# Patient Record
Sex: MALE | Race: BLACK OR AFRICAN AMERICAN | NOT HISPANIC OR LATINO | Employment: OTHER | URBAN - METROPOLITAN AREA
[De-identification: names, ages, dates, MRNs, and addresses within clinical notes are randomized per-mention and may not be internally consistent; named-entity substitution may affect disease eponyms.]

---

## 2021-04-10 ENCOUNTER — APPOINTMENT (EMERGENCY)
Dept: RADIOLOGY | Facility: HOSPITAL | Age: 36
End: 2021-04-10
Payer: COMMERCIAL

## 2021-04-10 ENCOUNTER — HOSPITAL ENCOUNTER (EMERGENCY)
Facility: HOSPITAL | Age: 36
Discharge: HOME | End: 2021-04-10
Attending: EMERGENCY MEDICINE
Payer: COMMERCIAL

## 2021-04-10 VITALS
TEMPERATURE: 97.7 F | DIASTOLIC BLOOD PRESSURE: 88 MMHG | WEIGHT: 162 LBS | SYSTOLIC BLOOD PRESSURE: 167 MMHG | HEART RATE: 88 BPM | RESPIRATION RATE: 20 BRPM | OXYGEN SATURATION: 100 %

## 2021-04-10 DIAGNOSIS — S43.102A SEPARATION OF LEFT ACROMIOCLAVICULAR JOINT, INITIAL ENCOUNTER: ICD-10-CM

## 2021-04-10 DIAGNOSIS — M25.512 ACUTE PAIN OF LEFT SHOULDER: Primary | ICD-10-CM

## 2021-04-10 PROCEDURE — 99283 EMERGENCY DEPT VISIT LOW MDM: CPT

## 2021-04-10 PROCEDURE — 73030 X-RAY EXAM OF SHOULDER: CPT

## 2021-04-10 PROCEDURE — 96361 HYDRATE IV INFUSION ADD-ON: CPT

## 2021-04-10 PROCEDURE — 96375 TX/PRO/DX INJ NEW DRUG ADDON: CPT

## 2021-04-10 PROCEDURE — 99285 EMERGENCY DEPT VISIT HI MDM: CPT | Performed by: EMERGENCY MEDICINE

## 2021-04-10 PROCEDURE — 96374 THER/PROPH/DIAG INJ IV PUSH: CPT

## 2021-04-10 RX ORDER — ACETAMINOPHEN 500 MG
500 TABLET ORAL EVERY 6 HOURS PRN
Qty: 20 TABLET | Refills: 0 | Status: SHIPPED | OUTPATIENT
Start: 2021-04-10

## 2021-04-10 RX ORDER — MORPHINE SULFATE 4 MG/ML
4 INJECTION, SOLUTION INTRAMUSCULAR; INTRAVENOUS ONCE
Status: COMPLETED | OUTPATIENT
Start: 2021-04-10 | End: 2021-04-10

## 2021-04-10 RX ORDER — NAPROXEN 500 MG/1
500 TABLET ORAL 2 TIMES DAILY WITH MEALS
Qty: 30 TABLET | Refills: 0 | Status: SHIPPED | OUTPATIENT
Start: 2021-04-10 | End: 2021-04-15 | Stop reason: SDUPTHER

## 2021-04-10 RX ORDER — OXYCODONE HYDROCHLORIDE 5 MG/1
5 TABLET ORAL EVERY 4 HOURS PRN
Qty: 5 TABLET | Refills: 0 | Status: SHIPPED | OUTPATIENT
Start: 2021-04-10 | End: 2021-04-12

## 2021-04-10 RX ORDER — ONDANSETRON 4 MG/1
4 TABLET, ORALLY DISINTEGRATING ORAL EVERY 6 HOURS PRN
Qty: 20 TABLET | Refills: 0 | Status: SHIPPED | OUTPATIENT
Start: 2021-04-10 | End: 2022-06-21

## 2021-04-10 RX ORDER — ONDANSETRON 2 MG/ML
4 INJECTION INTRAMUSCULAR; INTRAVENOUS ONCE
Status: COMPLETED | OUTPATIENT
Start: 2021-04-10 | End: 2021-04-10

## 2021-04-10 RX ADMIN — MORPHINE SULFATE 4 MG: 4 INJECTION INTRAVENOUS at 17:01

## 2021-04-10 RX ADMIN — SODIUM CHLORIDE 1000 ML: 0.9 INJECTION, SOLUTION INTRAVENOUS at 16:58

## 2021-04-10 RX ADMIN — ONDANSETRON 4 MG: 2 INJECTION INTRAMUSCULAR; INTRAVENOUS at 16:59
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
## 2021-04-15 VITALS
SYSTOLIC BLOOD PRESSURE: 124 MMHG | WEIGHT: 160 LBS | HEIGHT: 69 IN | BODY MASS INDEX: 23.7 KG/M2 | HEART RATE: 61 BPM | DIASTOLIC BLOOD PRESSURE: 81 MMHG

## 2021-04-15 DIAGNOSIS — S43.102A SEPARATION OF LEFT ACROMIOCLAVICULAR JOINT, INITIAL ENCOUNTER: ICD-10-CM

## 2021-04-15 PROCEDURE — 99203 OFFICE O/P NEW LOW 30 MIN: CPT | Performed by: PHYSICIAN ASSISTANT

## 2021-04-15 RX ORDER — NAPROXEN 500 MG/1
500 TABLET ORAL 2 TIMES DAILY WITH MEALS
Qty: 30 TABLET | Refills: 0 | Status: SHIPPED | OUTPATIENT
Start: 2021-04-15 | End: 2022-06-21

## 2021-04-15 RX ORDER — HYDROCODONE BITARTRATE AND ACETAMINOPHEN 5; 325 MG/1; MG/1
TABLET ORAL
COMMUNITY
End: 2022-06-21

## 2021-04-15 RX ORDER — CYCLOBENZAPRINE HCL 10 MG
10 TABLET ORAL 3 TIMES DAILY PRN
Qty: 20 TABLET | Refills: 0 | Status: SHIPPED | OUTPATIENT
Start: 2021-04-15
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
## 2021-04-29 ENCOUNTER — HOSPITAL ENCOUNTER (OUTPATIENT)
Dept: RADIOLOGY | Facility: IMAGING CENTER | Age: 36
Discharge: HOME | End: 2021-04-29
Payer: COMMERCIAL

## 2021-04-29 DIAGNOSIS — S43.102A SEPARATION OF LEFT ACROMIOCLAVICULAR JOINT, INITIAL ENCOUNTER: ICD-10-CM

## 2021-04-29 PROCEDURE — G1004 CDSM NDSC: HCPCS

## 2021-04-29 PROCEDURE — 73221 MRI JOINT UPR EXTREM W/O DYE: CPT

## 2021-05-06 VITALS
SYSTOLIC BLOOD PRESSURE: 150 MMHG | HEIGHT: 69 IN | DIASTOLIC BLOOD PRESSURE: 99 MMHG | HEART RATE: 61 BPM | WEIGHT: 160.2 LBS | BODY MASS INDEX: 23.73 KG/M2

## 2021-05-06 DIAGNOSIS — M25.512 ACUTE PAIN OF LEFT SHOULDER: ICD-10-CM

## 2021-05-06 DIAGNOSIS — S43.109A ACROMIOCLAVICULAR JOINT SEPARATION, TYPE 3, INITIAL ENCOUNTER: Primary | ICD-10-CM

## 2021-05-06 PROCEDURE — 99214 OFFICE O/P EST MOD 30 MIN: CPT | Performed by: ORTHOPAEDIC SURGERY
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
## 2021-05-10 ENCOUNTER — EVALUATION (OUTPATIENT)
Dept: PHYSICAL THERAPY | Facility: REHABILITATION | Age: 36
End: 2021-05-10
Payer: COMMERCIAL

## 2021-05-10 DIAGNOSIS — M25.512 ACUTE PAIN OF LEFT SHOULDER: ICD-10-CM

## 2021-05-10 DIAGNOSIS — S43.109A ACROMIOCLAVICULAR JOINT SEPARATION, TYPE 3, INITIAL ENCOUNTER: ICD-10-CM

## 2021-05-10 PROCEDURE — 97110 THERAPEUTIC EXERCISES: CPT | Performed by: PHYSICAL THERAPIST

## 2021-05-10 PROCEDURE — 97112 NEUROMUSCULAR REEDUCATION: CPT | Performed by: PHYSICAL THERAPIST

## 2021-05-10 PROCEDURE — 97161 PT EVAL LOW COMPLEX 20 MIN: CPT | Performed by: PHYSICAL THERAPIST
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
## 2021-05-13 ENCOUNTER — OFFICE VISIT (OUTPATIENT)
Dept: PHYSICAL THERAPY | Facility: REHABILITATION | Age: 36
End: 2021-05-13
Payer: COMMERCIAL

## 2021-05-13 DIAGNOSIS — S43.109A ACROMIOCLAVICULAR JOINT SEPARATION, TYPE 3, INITIAL ENCOUNTER: Primary | ICD-10-CM

## 2021-05-13 DIAGNOSIS — M25.512 ACUTE PAIN OF LEFT SHOULDER: ICD-10-CM

## 2021-05-13 PROCEDURE — 97110 THERAPEUTIC EXERCISES: CPT | Performed by: PHYSICAL THERAPIST

## 2021-05-13 PROCEDURE — 97140 MANUAL THERAPY 1/> REGIONS: CPT | Performed by: PHYSICAL THERAPIST

## 2021-05-13 PROCEDURE — 97112 NEUROMUSCULAR REEDUCATION: CPT | Performed by: PHYSICAL THERAPIST

## 2021-05-13 NOTE — PROGRESS NOTES
Daily Note     Today's date: 2021  Patient name: Katharina Nixon  : 1985  MRN: [id number]  Referring provider: Lazarus Hake, MD  Dx:   Encounter Diagnosis     ICD-10-CM    1  Acromioclavicular joint separation, type 3, initial encounter  S43 109A    2  Acute pain of left shoulder  M25 512                   Subjective: Reports still feeling some burning and pain upon arrival today       Objective: See treatment diary below      Assessment: Tolerated treatment well  Patient would benefit from continued PT, was able to demonstrate HEP with no cueing necessary throughout, patient did well with first tx  Plan: Continue per plan of care        Precautions: none       Manuals              L shoulder flex,abd PROM CW 10'                                                    Neuro Re-Ed             TB rows, extensions  2x15 ea GTB            B/L shoulder ER TB 20x5" GTB            Prone shoulder extension w hold                                                                 Ther Ex             Pulleys  6'             UBE 3/3'             Supine cane flexion  10x10"             Wall slides                                                                  Ther Activity                                       Gait Training                                       Modalities

## 2021-05-18 ENCOUNTER — OFFICE VISIT (OUTPATIENT)
Dept: PHYSICAL THERAPY | Facility: REHABILITATION | Age: 36
End: 2021-05-18
Payer: COMMERCIAL

## 2021-05-18 DIAGNOSIS — S43.109A ACROMIOCLAVICULAR JOINT SEPARATION, TYPE 3, INITIAL ENCOUNTER: Primary | ICD-10-CM

## 2021-05-18 DIAGNOSIS — M25.512 ACUTE PAIN OF LEFT SHOULDER: ICD-10-CM

## 2021-05-18 PROCEDURE — 97112 NEUROMUSCULAR REEDUCATION: CPT | Performed by: PHYSICAL THERAPIST

## 2021-05-18 PROCEDURE — 97110 THERAPEUTIC EXERCISES: CPT | Performed by: PHYSICAL THERAPIST

## 2021-05-18 PROCEDURE — 97140 MANUAL THERAPY 1/> REGIONS: CPT | Performed by: PHYSICAL THERAPIST

## 2021-05-18 NOTE — PROGRESS NOTES
Daily Note     Today's date: 2021  Patient name: Yennifer Harrison  : 1985  MRN: [id number]  Referring provider: Fitz Baxter MD  Dx:   Encounter Diagnosis     ICD-10-CM    1  Acromioclavicular joint separation, type 3, initial encounter  S43 109A    2  Acute pain of left shoulder  M25 512                   Subjective: Reports no new complaints upon arrival today  Objective: See treatment diary below      Assessment: Tolerated treatment well  Patient would benefit from continued PT, reports no complaints throughout session today, and was able to increase L shoulder range of motion into flexion and abduction today  Plan: Continue per plan of care        Precautions: none       Manuals             L shoulder flex,abd PROM CW 10'  CW 10'                                                   Neuro Re-Ed             TB rows, extensions  2x15 ea GTB 2x15 ea R16/R20            B/L shoulder ER TB 20x5" GTB 2x15x5" GTB           Prone shoulder extension w hold                                                                 Ther Ex             Pulleys  6'  np           UBE 3/3'  4/4'            Supine cane flexion  10x10"  20x10"            Wall slides   10x10"                                                                Ther Activity                                       Gait Training                                       Modalities

## 2021-05-20 ENCOUNTER — OFFICE VISIT (OUTPATIENT)
Dept: PHYSICAL THERAPY | Facility: REHABILITATION | Age: 36
End: 2021-05-20
Payer: COMMERCIAL

## 2021-05-20 DIAGNOSIS — M25.512 ACUTE PAIN OF LEFT SHOULDER: ICD-10-CM

## 2021-05-20 DIAGNOSIS — S43.109A ACROMIOCLAVICULAR JOINT SEPARATION, TYPE 3, INITIAL ENCOUNTER: Primary | ICD-10-CM

## 2021-05-20 PROCEDURE — 97110 THERAPEUTIC EXERCISES: CPT | Performed by: PHYSICAL THERAPIST

## 2021-05-20 PROCEDURE — 97112 NEUROMUSCULAR REEDUCATION: CPT | Performed by: PHYSICAL THERAPIST

## 2021-05-20 PROCEDURE — 97140 MANUAL THERAPY 1/> REGIONS: CPT | Performed by: PHYSICAL THERAPIST

## 2021-05-20 NOTE — PROGRESS NOTES
Daily Note     Today's date: 2021  Patient name: Lilli Winslow  : 1985  MRN: [id number]  Referring provider: Annie Montesinos MD  Dx:   Encounter Diagnosis     ICD-10-CM    1  Acromioclavicular joint separation, type 3, initial encounter  S43 109A    2  Acute pain of left shoulder  M25 512                   Subjective: "I feel like I'm doing more and more with my shoulder and feeling better"       Objective: See treatment diary below      Assessment: Tolerated treatment well  Patient would benefit from continued PT, no complaints throughout session today, demonstrated improved L shoulder range of motion  Plan: Continue per plan of care        Precautions: none       Manuals            L shoulder flex,abd PROM CW 10'  CW 10'  CW 10'                                                  Neuro Re-Ed             TB rows, extensions  2x15 ea GTB 2x15 ea R16/R20  2x15 ea MTB          B/L shoulder ER TB 20x5" GTB 2x15x5" GTB           Prone shoulder extension w hold   30x5"           Prone T's   30x5"                                                  Ther Ex             Pulleys  6'  np np          UBE 3/3'  4/4'  4/4'           Supine cane flexion  10x10"  20x10"            Wall slides   10x10"  [id number]" OTB          Wall walks TB   2x10 OTB                                                 Ther Activity                                       Gait Training                                       Modalities

## 2021-05-25 ENCOUNTER — APPOINTMENT (OUTPATIENT)
Dept: PHYSICAL THERAPY | Facility: REHABILITATION | Age: 36
End: 2021-05-25
Payer: COMMERCIAL

## 2021-05-27 ENCOUNTER — OFFICE VISIT (OUTPATIENT)
Dept: PHYSICAL THERAPY | Facility: REHABILITATION | Age: 36
End: 2021-05-27
Payer: COMMERCIAL

## 2021-05-27 DIAGNOSIS — S43.109A ACROMIOCLAVICULAR JOINT SEPARATION, TYPE 3, INITIAL ENCOUNTER: Primary | ICD-10-CM

## 2021-05-27 DIAGNOSIS — M25.512 ACUTE PAIN OF LEFT SHOULDER: ICD-10-CM

## 2021-05-27 PROCEDURE — 97112 NEUROMUSCULAR REEDUCATION: CPT | Performed by: PHYSICAL THERAPIST

## 2021-05-27 PROCEDURE — 97110 THERAPEUTIC EXERCISES: CPT | Performed by: PHYSICAL THERAPIST

## 2021-05-27 PROCEDURE — 97140 MANUAL THERAPY 1/> REGIONS: CPT | Performed by: PHYSICAL THERAPIST

## 2021-05-27 NOTE — PROGRESS NOTES
Daily Note     Today's date: 2021  Patient name: Reggie Amanda  : 1985  MRN: [id number]  Referring provider: Judy Jaramillo MD  Dx:   Encounter Diagnosis     ICD-10-CM    1  Acromioclavicular joint separation, type 3, initial encounter  S43 109A    2  Acute pain of left shoulder  M25 512                   Subjective: Reports no new complaints upon arrival to therapy today  Objective: See treatment diary below      Assessment: Tolerated treatment well  Patient would benefit from continued PT, demonstrates improved range of motion today with decreased complaints of pain  Can continue to progress in future tx's  Plan: Continue per plan of care        Precautions: none       Manuals           L shoulder flex,abd PROM CW 10'  CW 10'  CW 10'  CW 10'                                                 Neuro Re-Ed             rows, extensions  2x15 ea GTB 2x15 ea R16/R20  2x15 ea MTB 2x20 ea 20# rows 24# LPD          B/L shoulder ER TB 20x5" GTB 2x15x5" GTB  2x10x5"          Prone shoulder extension w hold   30x5"  30x5"          Prone T's   30x5"  30x5"                                                 Ther Ex             Pulleys  6'  np np np         UBE 3/3'  4/4'  4/4'  4/4'          Supine cane flexion  10x10"  20x10"            Wall slides   10x10"  [id number]" OTB [id number]" GTB         Wall walks TB   2x10 OTB 2x10 GTB         Wall clocks TB                                       Ther Activity                                       Gait Training                                       Modalities

## 2021-06-02 ENCOUNTER — APPOINTMENT (OUTPATIENT)
Dept: PHYSICAL THERAPY | Facility: REHABILITATION | Age: 36
End: 2021-06-02
Payer: COMMERCIAL

## 2021-06-03 ENCOUNTER — OFFICE VISIT (OUTPATIENT)
Dept: PHYSICAL THERAPY | Facility: REHABILITATION | Age: 36
End: 2021-06-03
Payer: COMMERCIAL

## 2021-06-03 DIAGNOSIS — S43.109A ACROMIOCLAVICULAR JOINT SEPARATION, TYPE 3, INITIAL ENCOUNTER: Primary | ICD-10-CM

## 2021-06-03 DIAGNOSIS — M25.512 ACUTE PAIN OF LEFT SHOULDER: ICD-10-CM

## 2021-06-03 PROCEDURE — 97112 NEUROMUSCULAR REEDUCATION: CPT | Performed by: PHYSICAL THERAPIST

## 2021-06-03 PROCEDURE — 97110 THERAPEUTIC EXERCISES: CPT | Performed by: PHYSICAL THERAPIST

## 2021-06-03 PROCEDURE — 97140 MANUAL THERAPY 1/> REGIONS: CPT | Performed by: PHYSICAL THERAPIST

## 2021-06-03 NOTE — PROGRESS NOTES
Daily Note     Today's date: 6/3/2021  Patient name: Benjamin Chaparro  : 1985  MRN: [id number]  Referring provider: Marry Kingston MD  Dx:   Encounter Diagnosis     ICD-10-CM    1  Acromioclavicular joint separation, type 3, initial encounter  S43 109A    2  Acute pain of left shoulder  M25 512                   Subjective: Reports feeling better upon arrival today       Objective: See treatment diary below      Assessment: Tolerated treatment well  Patient would benefit from continued PT, patient educated on HEP to perform to continue through rehab from shoulder injury  Patient will contact if further PT is needed         Plan: D/C to HEP     Precautions: none       Manuals 5/13  5/18  5/20  5/27  6/3         L shoulder flex,abd PROM CW 10'  CW 10'  CW 10'  CW 10'  CW 10'                                                Neuro Re-Ed             rows, extensions  2x15 ea GTB 2x15 ea R16/R20  2x15 ea MTB 2x20 ea 20# rows 24# LPD          B/L shoulder ER TB 20x5" GTB 2x15x5" GTB  2x10x5"          Prone shoulder extension w hold   30x5"  30x5"  30x5"         Prone T's   30x5"  30x5"  30x5"                                                Ther Ex             Pulleys  6'  np np np         UBE 3/3'  4/4'  4/4'  4/4'  3/3'         Supine cane flexion  10x10"  20x10"            Wall slides   10x10"  [id number]" OTB 7u25j19" GTB np        Wall walks TB   2x10 OTB 2x10 GTB 2x10 GTB        Wall clocks TB     5x GTB                                  Ther Activity                                       Gait Training                                       Modalities

## 2022-06-21 ENCOUNTER — OFFICE VISIT (OUTPATIENT)
Dept: FAMILY MEDICINE CLINIC | Facility: CLINIC | Age: 37
End: 2022-06-21
Payer: COMMERCIAL

## 2022-06-21 VITALS
DIASTOLIC BLOOD PRESSURE: 74 MMHG | SYSTOLIC BLOOD PRESSURE: 128 MMHG | WEIGHT: 151.6 LBS | TEMPERATURE: 97.8 F | RESPIRATION RATE: 18 BRPM | HEART RATE: 68 BPM | BODY MASS INDEX: 22.45 KG/M2 | OXYGEN SATURATION: 98 % | HEIGHT: 69 IN

## 2022-06-21 DIAGNOSIS — R06.81 WITNESSED EPISODE OF APNEA: Primary | ICD-10-CM

## 2022-06-21 DIAGNOSIS — T14.8XXA SPLINTER: ICD-10-CM

## 2022-06-21 DIAGNOSIS — Z11.59 NEED FOR HEPATITIS C SCREENING TEST: ICD-10-CM

## 2022-06-21 DIAGNOSIS — Z23 ENCOUNTER FOR IMMUNIZATION: ICD-10-CM

## 2022-06-21 DIAGNOSIS — K59.1 FUNCTIONAL DIARRHEA: ICD-10-CM

## 2022-06-21 DIAGNOSIS — Z11.4 SCREENING FOR HIV (HUMAN IMMUNODEFICIENCY VIRUS): ICD-10-CM

## 2022-06-21 PROCEDURE — 99204 OFFICE O/P NEW MOD 45 MIN: CPT | Performed by: FAMILY MEDICINE

## 2022-06-21 RX ORDER — TIZANIDINE 4 MG/1
4 TABLET ORAL 2 TIMES DAILY
COMMUNITY
Start: 2022-05-19

## 2022-06-21 RX ORDER — MELOXICAM 7.5 MG/1
7.5 TABLET ORAL 2 TIMES DAILY WITH MEALS
COMMUNITY
Start: 2022-05-19

## 2022-06-21 RX ORDER — OXYCODONE HYDROCHLORIDE 10 MG/1
10 TABLET ORAL EVERY 6 HOURS PRN
COMMUNITY
Start: 2022-06-03

## 2022-06-21 NOTE — PROGRESS NOTES
[address] [facility]    NAME: Monty Kehr  AGE: 39 y o  SEX: male  : 1985     DATE: 2022     Assessment and Plan:     Problem List Items Addressed This Visit    None     Visit Diagnoses     Witnessed episode of apnea    -  Primary    Relevant Orders    Ambulatory Referral to Sleep Medicine    Need for hepatitis C screening test        Relevant Orders    Hepatitis C Antibody ([facility])    Screening for HIV (human immunodeficiency virus)        Relevant Orders    HIV 1/2 Antigen/Antibody (4th Generation) w Reflex [facility]    Encounter for immunization        Functional diarrhea        Relevant Orders    Comprehensive metabolic panel    CBC and differential    Splinter        Relevant Medications    Diclofenac Sodium (VOLTAREN) 1 %    Other Relevant Orders    Ambulatory Referral to Hand Surgery          Immunizations and preventive care screenings were discussed with patient today  Appropriate education was printed on patient's after visit summary  Chief Complaint:     Chief Complaint   Patient presents with   [address] Foreign Body in Skin     Right palm of hand  Pt reported hegot a long splinter in hand one month ago  Pt believes there is still something in there  Darkened spot near base of thumb  Pain is 2/10 at rest, 8/10 when pressure applied      History of Present Illness:     Adult Annual Physical   Patient here for a comprehensive physical exam  The patient reports recent splinter in hand that he says is  1 month later  Also reports anxiety but states he feels it is well handled as he always has someone with him  Diet and Physical Activity  · Diet/Nutrition: low fat diet, limited fruits/vegetables and fair but not perfect diet  Junk food after dinner      · Exercise: 5-7 times a week on average, less than 30 minutes on average and works a physical job        Depression Screening  PHQ-2/9 Depression Screening    Little interest or pleasure in doing things: 0 - not at all  Feeling down, depressed, or hopeless: 0 - not at all  PHQ-2 Score: 0  PHQ-2 Interpretation: Negative depression screen       General Health  · Sleep: sleeps well, gets 7-8 hours of sleep on average, witnessed apnea and sweats  · Hearing: normal - bilateral   · Vision: most recent eye exam >1 year ago and wears glasses  · Dental: regular dental visits, does not floss and q48 brushing   Health  · History of STDs?: no      Review of Systems:     Review of Systems   Constitutional: Positive for diaphoresis  Negative for chills, fever and unexpected weight change  HENT: Negative for congestion, ear pain, hearing loss, rhinorrhea, sore throat and tinnitus  Eyes: Negative for pain and visual disturbance  Respiratory: Negative for cough and shortness of breath  Cardiovascular: Negative for chest pain and palpitations  Gastrointestinal: Positive for diarrhea, nausea and vomiting  Negative for abdominal pain and constipation  Endocrine: Negative for polyuria  Genitourinary: Negative for dysuria and hematuria  Musculoskeletal: Negative for arthralgias and back pain  Skin: Positive for wound  Negative for color change and rash  Neurological: Negative for dizziness, seizures, syncope, light-headedness and headaches  All other systems reviewed and are negative  Past Medical History:     Past Medical History:   Diagnosis Date    Asthma     PTSD (post-traumatic stress disorder)       Past Surgical History:     History reviewed  No pertinent surgical history     Social History:     Social History     Socioeconomic History    Marital status: Single     Spouse name: None    Number of children: None    Years of education: None    Highest education level: None   Occupational History    None   Tobacco Use    Smoking status: Current Every Day Smoker    Smokeless tobacco: Never Used    Tobacco comment: Pt uses vape pen   Vaping Use    Vaping Use: Every day    Substances: Nicotine   Substance and Sexual Activity    Alcohol use: Not Currently    Drug use: Yes     Types: Marijuana     Comment: Pt has medical marijuana card    Sexual activity: None   Other Topics Concern    None   Social History Narrative    None     Social Determinants of Health     Financial Resource Strain: Not on file   Food Insecurity: Not on file   Transportation Needs: Not on file   Physical Activity: Not on file   Stress: Not on file   Social Connections: Not on file   Intimate Partner Violence: Not on file   Housing Stability: Not on file      Family History:     Family History   Problem Relation Age of Onset    Stroke Father     Glaucoma Father       Current Medications:     Current Outpatient Medications   Medication Sig Dispense Refill    acetaminophen (TYLENOL) 500 mg tablet Take 1 tablet (500 mg total) by mouth every 6 (six) hours as needed for mild pain 20 tablet 0    cyclobenzaprine (FLEXERIL) 10 mg tablet Take 1 tablet (10 mg total) by mouth 3 (three) times a day as needed for muscle spasms 20 tablet 0    Diclofenac Sodium (VOLTAREN) 1 % APPLY SPARINGLY TO THE AFFECTED AREA TWICE A DAY AS NEEDED      meloxicam (MOBIC) 7 5 mg tablet Take 7 5 mg by mouth 2 (two) times a day with meals      oxyCODONE (ROXICODONE) 10 MG TABS Take 10 mg by mouth every 6 (six) hours as needed      tiZANidine (ZANAFLEX) 4 mg tablet 4 mg 2 (two) times a day       No current facility-administered medications for this visit  Allergies:     No Known Allergies   Physical Exam:     /74   Pulse 68   Temp 97 8 °F (36 6 °C) (Tympanic)   Resp 18   Ht 5' 9" (1 753 m)   Wt 68 8 kg (151 lb 9 6 oz)   SpO2 98%   BMI 22 39 kg/m²     Physical Exam  Vitals and nursing note reviewed  Constitutional:       Appearance: He is well-developed  HENT:      Head: Normocephalic and atraumatic     Eyes:      Conjunctiva/sclera: Conjunctivae normal  Cardiovascular:      Rate and Rhythm: Normal rate and regular rhythm  Heart sounds: No murmur heard  Pulmonary:      Effort: Pulmonary effort is normal  No respiratory distress  Breath sounds: Normal breath sounds  Abdominal:      Palpations: Abdomen is soft  Tenderness: There is no abdominal tenderness  Musculoskeletal:      Cervical back: Neck supple  Skin:     General: Skin is warm and dry  Findings: Lesion (R hand has dark area on thenar eminence, fibrosis noted on POCUS but no foreign object identified on US) present  Neurological:      Mental Status: He is alert and oriented to person, place, and time     Psychiatric:         Behavior: Behavior normal           Anselmo Ovalles DO   [address]

## 2022-06-24 ENCOUNTER — TELEPHONE (OUTPATIENT)
Dept: OBGYN CLINIC | Facility: HOSPITAL | Age: 37
End: 2022-06-24

## 2022-06-24 NOTE — TELEPHONE ENCOUNTER
Hello,  Please advise if the following patient can be forced onto the schedule:  Patient:  Ashanti See   :  [birth year]  MRN:  [id number]  INSURANCE:  [employer]  (didn't have card available to update system)  Call back #:[phone]  Reason for appointment:   Patient has a splinter in rt palm & it is black  Requested doctor/location: [place] office  Thank you in advance!

## 2022-07-25 ENCOUNTER — TELEPHONE (OUTPATIENT)
Dept: OTHER | Facility: OTHER | Age: 37
End: 2022-07-25

## 2022-07-25 ENCOUNTER — OFFICE VISIT (OUTPATIENT)
Dept: FAMILY MEDICINE CLINIC | Facility: CLINIC | Age: 37
End: 2022-07-25
Payer: COMMERCIAL

## 2022-07-25 VITALS
SYSTOLIC BLOOD PRESSURE: 132 MMHG | BODY MASS INDEX: 21.71 KG/M2 | OXYGEN SATURATION: 98 % | WEIGHT: 147 LBS | DIASTOLIC BLOOD PRESSURE: 74 MMHG | HEART RATE: 75 BPM | RESPIRATION RATE: 18 BRPM | TEMPERATURE: 98.4 F

## 2022-07-25 DIAGNOSIS — K40.90 INDIRECT RIGHT INGUINAL HERNIA: Primary | ICD-10-CM

## 2022-07-25 PROCEDURE — 99213 OFFICE O/P EST LOW 20 MIN: CPT | Performed by: FAMILY MEDICINE

## 2022-07-25 NOTE — PATIENT INSTRUCTIONS
Inguinal Hernia   WHAT YOU NEED TO KNOW:   What is an inguinal hernia? An inguinal hernia happens when organs or abdominal tissue push through a weak spot in the abdominal wall  The abdominal wall is made of fat and muscle  It holds the intestines in place  The hernia may contain fluid, tissue from the abdomen, or part of an organ (such as an intestine)  What causes an inguinal hernia? The cause of your hernia may not be known  You may have been born with a weak spot or opening in the abdominal wall  The area may have become weak from surgery or an injury  You may get a hernia after you lift something heavy or strain during a bowel movement  Your risk for a hernia may be increased if you smoke or you are overweight  Inguinal hernias are more common in males  A family history of hernias increases your risk for an inguinal hernia  What are the signs and symptoms of an inguinal hernia? A hernia may happen over time or it may happen suddenly  Some movements can make symptoms worse  Examples include when you cough, sneeze, strain to have a bowel movement, lift, or stand for a long time  You may have any of the following:  A soft lump or bulge in your groin, lower abdomen, or scrotum     Pain or burning in your abdomen    How is an inguinal hernia diagnosed? Your healthcare provider may ask you to bend or cough to see if he can feel your hernia  You may need blood or urine tests to check your kidney function or find signs of infection  X-ray, MRI, CT scan, or ultrasound pictures may show blockage in the intestines or lack of blood flow to organs  You may be given contrast liquid to help the organs show up better in the pictures  Tell the healthcare provider if you have ever had an allergic reaction to contrast liquid  Do not enter the MRI room with anything metal  Metal can cause serious injury  Tell the healthcare provider if you have any metal in or on your body  How is an inguinal hernia treated?    A manual reduction of the hernia  may be needed  Manual reduction means your healthcare provider will use his hands to put firm, steady pressure on your hernia  He will continue until the hernia disappears inside the abdominal wall  Surgery  may be needed if the hernia stops blood flow to any of the organs  Surgery may also be needed if the hernia causes a hole in the intestines, or blocks the intestines  How can I manage my symptoms and prevent another hernia? Do not lift anything heavy  Heavy lifting can make your hernia worse or cause another hernia  Ask your healthcare provider how much is safe for you to lift  Drink liquids as directed  Liquids may prevent constipation and straining during a bowel movement  Ask how much liquid to drink each day and which liquids are best for you  Eat foods high in fiber  Fiber may prevent constipation and straining during a bowel movement  Foods that contain fiber include fruits, vegetables, beans, lentils, and whole grains  Maintain a healthy weight  If you are overweight, weight loss may prevent your hernia from getting worse  It may also prevent another hernia  Talk to your healthcare provider about exercise and how to lose weight safely if you are overweight  Do not smoke  Nicotine and other chemicals in cigarettes and cigars can weaken the abdominal wall  This may increase your risk for another hernia  Ask your healthcare provider for information if you currently smoke and need help to quit  E-cigarettes or smokeless tobacco still contain nicotine  Talk to your healthcare provider before you use these products  Take NSAIDs as directed  NSAIDs, such as ibuprofen, help decrease swelling, pain, and fever  NSAIDs can cause stomach bleeding or kidney problems in certain people  If you take blood thinner medicine, always ask your healthcare provider if NSAIDs are safe for you  Always read the medicine label and follow directions      When should I seek immediate care? You have severe abdominal pain with nausea and vomiting  Your abdomen is larger than usual      Your hernia gets bigger or is purple or blue  You see blood in your bowel movements  You feel weak, dizzy, or faint  When should I contact my healthcare provider? You have a fever  You have questions or concerns about your condition or care  CARE AGREEMENT:   You have the right to help plan your care  Learn about your health condition and how it may be treated  Discuss treatment options with your healthcare providers to decide what care you want to receive  You always have the right to refuse treatment  The above information is an  only  It is not intended as medical advice for individual conditions or treatments  Talk to your doctor, nurse or pharmacist before following any medical regimen to see if it is safe and effective for you  © Copyright [employer] 2022 Information is for End User's use only and may not be sold, redistributed or otherwise used for commercial purposes   All illustrations and images included in CareNotes® are the copyrighted property of [employer]  or [address] [place], [place] [address]

## 2022-07-25 NOTE — PROGRESS NOTES
Assessment/Plan:      Diagnoses and all orders for this visit:    Indirect right inguinal hernia  -     Patient reports further development of his abdominal pain now more localized and reported to be worse with BMs  He notes a lump in the R inguinal region worse with straining, yelling, etc   -      Exam shows a bulge consistent with hernia in the R inguinal region  Not discolored  Not showing signs of incarceration    - Patient referred to General Surgery and given warning signs of incarceration including severe pain, inability to reduce the hernia, n/v, and/or f/c  Subjective:     Patient ID: Mirian Cisneros is a 39 y o  male  HPI    Patient reports further development of his abdominal pain now more localized and reported to be worse with BMs  He notes a lump in the R inguinal region worse with straining, yelling, etc  He does work a physical job, and exercises fairly regularly  Patient reports his anxiety/ptsd is well controled at present and that he does not want to address these at this time  Review of Systems   Constitutional: Negative for chills and fever  HENT: Negative for congestion, ear pain, rhinorrhea and sore throat  Eyes: Negative for pain and visual disturbance  Respiratory: Negative for cough and shortness of breath  Cardiovascular: Negative for chest pain and palpitations  Gastrointestinal: Positive for abdominal pain (worse when lifting or having BM)  Negative for constipation, diarrhea, nausea and vomiting  Genitourinary: Negative for dysuria and hematuria  Musculoskeletal: Negative for arthralgias and back pain  Skin: Negative for color change and rash  Neurological: Negative for seizures and syncope  All other systems reviewed and are negative  Objective:     Physical Exam  Constitutional:       General: He is not in acute distress  Appearance: He is well-developed and normal weight  He is not ill-appearing or toxic-appearing     HENT: Head: Normocephalic and atraumatic  Eyes:      Extraocular Movements: Extraocular movements intact  Pupils: Pupils are equal, round, and reactive to light  Cardiovascular:      Rate and Rhythm: Normal rate and regular rhythm  Heart sounds: Normal heart sounds  Pulmonary:      Effort: Pulmonary effort is normal    Abdominal:      Hernia: A hernia is present  Hernia is present in the right inguinal area  Skin:     General: Skin is warm and dry  Neurological:      Mental Status: He is alert and oriented to person, place, and time     Psychiatric:         Mood and Affect: Mood normal          Behavior: Behavior normal

## 2022-07-26 ENCOUNTER — APPOINTMENT (OUTPATIENT)
Dept: LAB | Facility: CLINIC | Age: 37
End: 2022-07-26
Payer: COMMERCIAL

## 2022-07-26 DIAGNOSIS — Z11.4 SCREENING FOR HIV (HUMAN IMMUNODEFICIENCY VIRUS): ICD-10-CM

## 2022-07-26 DIAGNOSIS — Z11.59 NEED FOR HEPATITIS C SCREENING TEST: ICD-10-CM

## 2022-07-26 DIAGNOSIS — K59.1 FUNCTIONAL DIARRHEA: ICD-10-CM

## 2022-07-26 LAB
ALBUMIN SERPL BCP-MCNC: 4.5 G/DL (ref 3.5–5)
ALP SERPL-CCNC: 54 U/L (ref 34–104)
ALT SERPL W P-5'-P-CCNC: 16 U/L (ref 7–52)
ANION GAP SERPL CALCULATED.3IONS-SCNC: 4 MMOL/L (ref 4–13)
AST SERPL W P-5'-P-CCNC: 13 U/L (ref 13–39)
BASOPHILS # BLD AUTO: 0.05 THOUSANDS/ΜL (ref 0–0.1)
BASOPHILS NFR BLD AUTO: 1 % (ref 0–1)
BILIRUB SERPL-MCNC: 0.7 MG/DL (ref 0.2–1)
BUN SERPL-MCNC: 11 MG/DL (ref 5–25)
CALCIUM SERPL-MCNC: 9.7 MG/DL (ref 8.4–10.2)
CHLORIDE SERPL-SCNC: 106 MMOL/L (ref 96–108)
CO2 SERPL-SCNC: 28 MMOL/L (ref 21–32)
CREAT SERPL-MCNC: 0.99 MG/DL (ref 0.6–1.3)
EOSINOPHIL # BLD AUTO: 0.19 THOUSAND/ΜL (ref 0–0.61)
EOSINOPHIL NFR BLD AUTO: 3 % (ref 0–6)
ERYTHROCYTE [DISTWIDTH] IN BLOOD BY AUTOMATED COUNT: 12 % (ref 11.6–15.1)
GFR SERPL CREATININE-BSD FRML MDRD: 97 ML/MIN/1.73SQ M
GLUCOSE P FAST SERPL-MCNC: 87 MG/DL (ref 65–99)
HCT VFR BLD AUTO: 44.6 % (ref 36.5–49.3)
HCV AB SER QL: NORMAL
HGB BLD-MCNC: 15.3 G/DL (ref 12–17)
IMM GRANULOCYTES # BLD AUTO: 0.01 THOUSAND/UL (ref 0–0.2)
IMM GRANULOCYTES NFR BLD AUTO: 0 % (ref 0–2)
LYMPHOCYTES # BLD AUTO: 3.74 THOUSANDS/ΜL (ref 0.6–4.47)
LYMPHOCYTES NFR BLD AUTO: 55 % (ref 14–44)
MCH RBC QN AUTO: 31 PG (ref 26.8–34.3)
MCHC RBC AUTO-ENTMCNC: 34.3 G/DL (ref 31.4–37.4)
MCV RBC AUTO: 90 FL (ref 82–98)
MONOCYTES # BLD AUTO: 0.38 THOUSAND/ΜL (ref 0.17–1.22)
MONOCYTES NFR BLD AUTO: 6 % (ref 4–12)
NEUTROPHILS # BLD AUTO: 2.37 THOUSANDS/ΜL (ref 1.85–7.62)
NEUTS SEG NFR BLD AUTO: 35 % (ref 43–75)
NRBC BLD AUTO-RTO: 0 /100 WBCS
PLATELET # BLD AUTO: 207 THOUSANDS/UL (ref 149–390)
PMV BLD AUTO: 9.5 FL (ref 8.9–12.7)
POTASSIUM SERPL-SCNC: 4 MMOL/L (ref 3.5–5.3)
PROT SERPL-MCNC: 6.9 G/DL (ref 6.4–8.4)
RBC # BLD AUTO: 4.94 MILLION/UL (ref 3.88–5.62)
SODIUM SERPL-SCNC: 138 MMOL/L (ref 135–147)
WBC # BLD AUTO: 6.74 THOUSAND/UL (ref 4.31–10.16)

## 2022-07-26 PROCEDURE — 87389 HIV-1 AG W/HIV-1&-2 AB AG IA: CPT

## 2022-07-26 PROCEDURE — 86803 HEPATITIS C AB TEST: CPT

## 2022-07-26 PROCEDURE — 85025 COMPLETE CBC W/AUTO DIFF WBC: CPT

## 2022-07-26 PROCEDURE — 80053 COMPREHEN METABOLIC PANEL: CPT

## 2022-07-26 PROCEDURE — 36415 COLL VENOUS BLD VENIPUNCTURE: CPT

## 2022-07-27 LAB — HIV 1+2 AB+HIV1 P24 AG SERPL QL IA: NORMAL

## 2022-08-01 ENCOUNTER — CONSULT (OUTPATIENT)
Dept: SURGERY | Facility: CLINIC | Age: 37
End: 2022-08-01
Payer: COMMERCIAL

## 2022-08-01 VITALS
HEIGHT: 69 IN | DIASTOLIC BLOOD PRESSURE: 64 MMHG | WEIGHT: 151.6 LBS | TEMPERATURE: 97.4 F | BODY MASS INDEX: 22.45 KG/M2 | OXYGEN SATURATION: 99 % | HEART RATE: 69 BPM | SYSTOLIC BLOOD PRESSURE: 126 MMHG

## 2022-08-01 DIAGNOSIS — Z01.818 PREOPERATIVE EXAMINATION: ICD-10-CM

## 2022-08-01 DIAGNOSIS — R10.31 RIGHT GROIN PAIN: Primary | ICD-10-CM

## 2022-08-01 DIAGNOSIS — K40.90 INDIRECT RIGHT INGUINAL HERNIA: ICD-10-CM

## 2022-08-01 PROCEDURE — 99203 OFFICE O/P NEW LOW 30 MIN: CPT | Performed by: SURGERY

## 2022-08-01 RX ORDER — CEFAZOLIN SODIUM 1 G/50ML
1000 SOLUTION INTRAVENOUS ONCE
Status: CANCELLED | OUTPATIENT
Start: 2022-08-12 | End: 2022-08-01

## 2022-08-01 NOTE — PROGRESS NOTES
Assessment/Plan:    No problem-specific Assessment & Plan notes found for this encounter  There are no diagnoses linked to this encounter       -patient's history is consistent with right inguinal hernia, although it is not readily obvious on exam   Due to the issue with pain and events and history we will plan to schedule patient for right groin exploration with right inguinal hernia repair with possible mesh placement at the patient's earliest convenience  Benefits risks and alternatives were discussed with the patient he verbalized understanding  Subjective:      Patient ID: Muriel Owen is a 39 y o  male  78-year-old male noticed a bulge in right groin for the last few weeks  Patient states he suffers from chronic nausea and sweats but this is mostly during bowel movements  States he is noticed right groin pain but did notice a bulge until a few weeks ago  The bulge comes out with exertion when going up and down stairs and lifting objects  Patient states this is painful and he would like it repaired  Denies any past surgical history  He takes chronic pain medications for shoulder pain  The following portions of the patient's history were reviewed and updated as appropriate: allergies, current medications, past family history, past medical history, past social history, past surgical history and problem list     Review of Systems   Constitutional: Negative  HENT: Negative  Respiratory: Negative  Cardiovascular: Negative  Gastrointestinal:        Bulge in right groin   Musculoskeletal: Negative  Skin: Negative  Psychiatric/Behavioral: Negative  Objective: There were no vitals taken for this visit  Physical Exam  Constitutional:       General: He is not in acute distress  Appearance: Normal appearance  He is not ill-appearing or toxic-appearing  HENT:      Head: Normocephalic and atraumatic     Cardiovascular:      Rate and Rhythm: Normal rate       Pulses: Normal pulses  Pulmonary:      Effort: Pulmonary effort is normal    Abdominal:      General: Abdomen is flat  There is no distension  Tenderness: There is no abdominal tenderness  Comments: Small bulge felt over right inguinal area with cough  Musculoskeletal:         General: Normal range of motion  Skin:     General: Skin is warm and dry  Capillary Refill: Capillary refill takes less than 2 seconds  Neurological:      General: No focal deficit present  Mental Status: He is alert and oriented to person, place, and time     Psychiatric:         Mood and Affect: Mood normal          Behavior: Behavior normal

## 2022-08-01 NOTE — H&P
Assessment/Plan:    No problem-specific Assessment & Plan notes found for this encounter  There are no diagnoses linked to this encounter       -patient's history is consistent with right inguinal hernia, although it is not readily obvious on exam   Due to the issue with pain and events and history we will plan to schedule patient for right groin exploration with right inguinal hernia repair with possible mesh placement at the patient's earliest convenience  Benefits risks and alternatives were discussed with the patient he verbalized understanding  Subjective:      Patient ID: Peg Jensen is a 39 y o  male  59-year-old male noticed a bulge in right groin for the last few weeks  Patient states he suffers from chronic nausea and sweats but this is mostly during bowel movements  States he is noticed right groin pain but did notice a bulge until a few weeks ago  The bulge comes out with exertion when going up and down stairs and lifting objects  Patient states this is painful and he would like it repaired  Denies any past surgical history  He takes chronic pain medications for shoulder pain  The following portions of the patient's history were reviewed and updated as appropriate: allergies, current medications, past family history, past medical history, past social history, past surgical history and problem list     Review of Systems   Constitutional: Negative  HENT: Negative  Respiratory: Negative  Cardiovascular: Negative  Gastrointestinal:        Bulge in right groin   Musculoskeletal: Negative  Skin: Negative  Psychiatric/Behavioral: Negative  Objective: There were no vitals taken for this visit  Physical Exam  Constitutional:       General: He is not in acute distress  Appearance: Normal appearance  He is not ill-appearing or toxic-appearing  HENT:      Head: Normocephalic and atraumatic     Cardiovascular:      Rate and Rhythm: Normal rate       Pulses: Normal pulses  Pulmonary:      Effort: Pulmonary effort is normal    Abdominal:      General: Abdomen is flat  There is no distension  Tenderness: There is no abdominal tenderness  Comments: Small bulge felt over right inguinal area with cough  Musculoskeletal:         General: Normal range of motion  Skin:     General: Skin is warm and dry  Capillary Refill: Capillary refill takes less than 2 seconds  Neurological:      General: No focal deficit present  Mental Status: He is alert and oriented to person, place, and time     Psychiatric:         Mood and Affect: Mood normal          Behavior: Behavior normal

## 2022-08-06 DIAGNOSIS — Z01.818 PREOPERATIVE EXAMINATION: ICD-10-CM

## 2022-08-06 PROCEDURE — U0003 INFECTIOUS AGENT DETECTION BY NUCLEIC ACID (DNA OR RNA); SEVERE ACUTE RESPIRATORY SYNDROME CORONAVIRUS 2 (SARS-COV-2) (CORONAVIRUS DISEASE [COVID-19]), AMPLIFIED PROBE TECHNIQUE, MAKING USE OF HIGH THROUGHPUT TECHNOLOGIES AS DESCRIBED BY CMS-2020-01-R: HCPCS | Performed by: SURGERY

## 2022-08-06 PROCEDURE — U0005 INFEC AGEN DETEC AMPLI PROBE: HCPCS | Performed by: SURGERY

## 2022-08-07 LAB — SARS-COV-2 RNA RESP QL NAA+PROBE: NEGATIVE

## 2022-08-09 ENCOUNTER — ANESTHESIA EVENT (OUTPATIENT)
Dept: PERIOP | Facility: HOSPITAL | Age: 37
End: 2022-08-09
Payer: COMMERCIAL

## 2022-08-09 NOTE — PRE-PROCEDURE INSTRUCTIONS
My Surgical Experience    The following information was developed to assist you to prepare for your operation  What do I need to do before coming to the hospital?   Arrange for a responsible person to drive you to and from the hospital    Arrange care for your children at home  Children are not allowed in the recovery areas of the hospital   Plan to wear clothing that is easy to put on and take off  If you are having shoulder surgery, wear a shirt that buttons or zippers in the front  Bathing  o Shower the evening before and the morning of your surgery with an antibacterial soap  Please refer to the Pre Op Showering Instructions for Surgery Patients Sheet   o Remove nail polish and all body piercing jewelry  o Do not shave any body part for at least 24 hours before surgery-this includes face, arms, legs and upper body  Food  o Nothing to eat or drink after midnight the night before your surgery  This includes candy and chewing gum  o Exception: If your surgery is after 12:00pm (noon), you may have clear liquids such as 7-Up®, ginger ale, apple or cranberry juice, Jell-O®, water, or clear broth until 8:00 am  o Do not drink milk or juice with pulp on the morning before surgery  o Do not drink alcohol 24 hours before surgery  Medicine  o Follow instructions you received from your surgeon about which medicines you may take on the day of surgery  o If instructed to take medicine on the morning of surgery, take pills with just a small sip of water  Call your prescribing doctor for specific infroamtion on what to do if you take insulin    What should I bring to the hospital?    Bring:  Alicia Gonzales or a walker, if you have them, for foot or knee surgery   A list of the daily medicines, vitamins, minerals, herbals and nutritional supplements you take   Include the dosages of medicines and the time you take them each day   Glasses, dentures or hearing aids   Minimal clothing; you will be wearing hospital sleepwear   Photo ID; required to verify your identity   If you have a Living Will or Power of , bring a copy of the documents   If you have an ostomy, bring an extra pouch and any supplies you use    Do not bring   Medicines or inhalers   Money, valuables or jewelry    What other information should I know about the day of surgery?  Notify your surgeons if you develop a cold, sore throat, cough, fever, rash or any other illness   Report to the Ambulatory Surgical/Same Day Surgery Unit   You will be instructed to stop at Registration only if you have not been pre-registered   Inform your  fi they do not stay that they will be asked by the staff to leave a phone number where they can be reached   Be available to be reached before surgery  In the event the operating room schedule changes, you may be asked to come in earlier or later than expected    *It is important to tell your doctor and others involved in your health care if you are taking or have been taking any non-prescription drugs, vitamins, minerals, herbals or other nutritional supplements  Any of these may interact with some food or medicines and cause a reaction      Pre-Surgery Instructions:   Medication Instructions    acetaminophen (TYLENOL) 500 mg tablet Hold day of surgery   cyclobenzaprine (FLEXERIL) 10 mg tablet Hold day of surgery   Diclofenac Sodium (VOLTAREN) 1 % Stop taking 3 days prior to surgery   meloxicam (MOBIC) 7 5 mg tablet Stop taking 3 days prior to surgery   oxyCODONE (ROXICODONE) 10 MG TABS Hold day of surgery   tiZANidine (ZANAFLEX) 4 mg tablet Hold day of surgery

## 2022-08-11 NOTE — ANESTHESIA PREPROCEDURE EVALUATION
Procedure:  REPAIR HERNIA INGUINAL (Right Groin)    Relevant Problems   ANESTHESIA (within normal limits)      CARDIO (within normal limits)      ENDO (within normal limits)      HEMATOLOGY (within normal limits)      PULMONARY   (+) Smoking        Physical Exam    Airway    Mallampati score: II  TM Distance: >3 FB  Neck ROM: full     Dental   No notable dental hx     Cardiovascular  Rhythm: regular, Rate: normal,     Pulmonary  Breath sounds clear to auscultation,     Other Findings        Anesthesia Plan  ASA Score- 2     Anesthesia Type- general with ASA Monitors  Additional Monitors:   Airway Plan: LMA  Plan Factors-Exercise tolerance (METS): >4 METS  Chart reviewed  Existing labs reviewed  Patient summary reviewed  Patient is a current smoker  Patient did not smoke on day of surgery  Obstructive sleep apnea risk education given perioperatively  Induction- intravenous  Postoperative Plan- Plan for postoperative opioid use  Planned trial extubation    Informed Consent- Anesthetic plan and risks discussed with patient  I personally reviewed this patient with the CRNA  Discussed and agreed on the Anesthesia Plan with the CRNA  Marialuisa Andino

## 2022-08-12 ENCOUNTER — ANESTHESIA (OUTPATIENT)
Dept: PERIOP | Facility: HOSPITAL | Age: 37
End: 2022-08-12
Payer: COMMERCIAL

## 2022-08-12 ENCOUNTER — HOSPITAL ENCOUNTER (OUTPATIENT)
Facility: HOSPITAL | Age: 37
Setting detail: OUTPATIENT SURGERY
Discharge: HOME | End: 2022-08-12
Attending: SURGERY | Admitting: SURGERY
Payer: COMMERCIAL

## 2022-08-12 VITALS
DIASTOLIC BLOOD PRESSURE: 92 MMHG | BODY MASS INDEX: 21.83 KG/M2 | WEIGHT: 147.4 LBS | HEART RATE: 58 BPM | OXYGEN SATURATION: 99 % | TEMPERATURE: 97 F | HEIGHT: 69 IN | SYSTOLIC BLOOD PRESSURE: 147 MMHG | RESPIRATION RATE: 18 BRPM

## 2022-08-12 DIAGNOSIS — K40.90 INDIRECT RIGHT INGUINAL HERNIA: ICD-10-CM

## 2022-08-12 PROBLEM — IMO0001 SMOKING: Status: ACTIVE | Noted: 2022-08-12

## 2022-08-12 PROBLEM — F17.200 SMOKING: Status: ACTIVE | Noted: 2022-08-12

## 2022-08-12 PROCEDURE — C1781 MESH (IMPLANTABLE): HCPCS | Performed by: SURGERY

## 2022-08-12 PROCEDURE — 49505 PRP I/HERN INIT REDUC >5 YR: CPT | Performed by: SURGERY

## 2022-08-12 PROCEDURE — 88302 TISSUE EXAM BY PATHOLOGIST: CPT | Performed by: PATHOLOGY

## 2022-08-12 PROCEDURE — 49505 PRP I/HERN INIT REDUC >5 YR: CPT | Performed by: PHYSICIAN ASSISTANT

## 2022-08-12 DEVICE — BARD MESH
Type: IMPLANTABLE DEVICE | Site: INGUINAL | Status: FUNCTIONAL
Brand: BARD MESH

## 2022-08-12 RX ORDER — MIDAZOLAM HYDROCHLORIDE 2 MG/2ML
INJECTION, SOLUTION INTRAMUSCULAR; INTRAVENOUS AS NEEDED
Status: DISCONTINUED | OUTPATIENT
Start: 2022-08-12 | End: 2022-08-12

## 2022-08-12 RX ORDER — DEXAMETHASONE SODIUM PHOSPHATE 10 MG/ML
INJECTION, SOLUTION INTRAMUSCULAR; INTRAVENOUS AS NEEDED
Status: DISCONTINUED | OUTPATIENT
Start: 2022-08-12 | End: 2022-08-12

## 2022-08-12 RX ORDER — MAGNESIUM HYDROXIDE 1200 MG/15ML
LIQUID ORAL AS NEEDED
Status: DISCONTINUED | OUTPATIENT
Start: 2022-08-12 | End: 2022-08-12 | Stop reason: HOSPADM

## 2022-08-12 RX ORDER — CEFAZOLIN SODIUM 1 G/50ML
1000 SOLUTION INTRAVENOUS ONCE
Status: COMPLETED | OUTPATIENT
Start: 2022-08-12 | End: 2022-08-12

## 2022-08-12 RX ORDER — PROPOFOL 10 MG/ML
INJECTION, EMULSION INTRAVENOUS AS NEEDED
Status: DISCONTINUED | OUTPATIENT
Start: 2022-08-12 | End: 2022-08-12

## 2022-08-12 RX ORDER — OXYCODONE HYDROCHLORIDE AND ACETAMINOPHEN 5; 325 MG/1; MG/1
1 TABLET ORAL EVERY 4 HOURS PRN
Qty: 8 TABLET | Refills: 0 | Status: SHIPPED | OUTPATIENT
Start: 2022-08-12

## 2022-08-12 RX ORDER — LIDOCAINE HYDROCHLORIDE 10 MG/ML
INJECTION, SOLUTION EPIDURAL; INFILTRATION; INTRACAUDAL; PERINEURAL AS NEEDED
Status: DISCONTINUED | OUTPATIENT
Start: 2022-08-12 | End: 2022-08-12

## 2022-08-12 RX ORDER — SODIUM CHLORIDE, SODIUM LACTATE, POTASSIUM CHLORIDE, CALCIUM CHLORIDE 600; 310; 30; 20 MG/100ML; MG/100ML; MG/100ML; MG/100ML
125 INJECTION, SOLUTION INTRAVENOUS CONTINUOUS
Status: DISCONTINUED | OUTPATIENT
Start: 2022-08-12 | End: 2022-08-12 | Stop reason: HOSPADM

## 2022-08-12 RX ORDER — FENTANYL CITRATE/PF 50 MCG/ML
50 SYRINGE (ML) INJECTION
Status: DISCONTINUED | OUTPATIENT
Start: 2022-08-12 | End: 2022-08-12 | Stop reason: HOSPADM

## 2022-08-12 RX ORDER — OXYCODONE HYDROCHLORIDE AND ACETAMINOPHEN 5; 325 MG/1; MG/1
1 TABLET ORAL ONCE
Status: COMPLETED | OUTPATIENT
Start: 2022-08-12 | End: 2022-08-12

## 2022-08-12 RX ORDER — FENTANYL CITRATE 50 UG/ML
INJECTION, SOLUTION INTRAMUSCULAR; INTRAVENOUS AS NEEDED
Status: DISCONTINUED | OUTPATIENT
Start: 2022-08-12 | End: 2022-08-12

## 2022-08-12 RX ORDER — ONDANSETRON 2 MG/ML
4 INJECTION INTRAMUSCULAR; INTRAVENOUS ONCE AS NEEDED
Status: DISCONTINUED | OUTPATIENT
Start: 2022-08-12 | End: 2022-08-12 | Stop reason: HOSPADM

## 2022-08-12 RX ORDER — SODIUM CHLORIDE, SODIUM LACTATE, POTASSIUM CHLORIDE, CALCIUM CHLORIDE 600; 310; 30; 20 MG/100ML; MG/100ML; MG/100ML; MG/100ML
100 INJECTION, SOLUTION INTRAVENOUS CONTINUOUS
Status: DISCONTINUED | OUTPATIENT
Start: 2022-08-12 | End: 2022-08-12 | Stop reason: HOSPADM

## 2022-08-12 RX ORDER — BUPIVACAINE HYDROCHLORIDE AND EPINEPHRINE 5; 5 MG/ML; UG/ML
INJECTION, SOLUTION PERINEURAL AS NEEDED
Status: DISCONTINUED | OUTPATIENT
Start: 2022-08-12 | End: 2022-08-12 | Stop reason: HOSPADM

## 2022-08-12 RX ORDER — ONDANSETRON 2 MG/ML
INJECTION INTRAMUSCULAR; INTRAVENOUS AS NEEDED
Status: DISCONTINUED | OUTPATIENT
Start: 2022-08-12 | End: 2022-08-12

## 2022-08-12 RX ADMIN — OXYCODONE HYDROCHLORIDE AND ACETAMINOPHEN 1 TABLET: 5; 325 TABLET ORAL at 12:58

## 2022-08-12 RX ADMIN — DEXAMETHASONE SODIUM PHOSPHATE 4 MG: 10 INJECTION, SOLUTION INTRAMUSCULAR; INTRAVENOUS at 10:33

## 2022-08-12 RX ADMIN — FENTANYL CITRATE 50 MCG: 50 INJECTION INTRAMUSCULAR; INTRAVENOUS at 11:07

## 2022-08-12 RX ADMIN — CEFAZOLIN SODIUM 1000 MG: 1 SOLUTION INTRAVENOUS at 10:30

## 2022-08-12 RX ADMIN — MIDAZOLAM 2 MG: 1 INJECTION INTRAMUSCULAR; INTRAVENOUS at 10:21

## 2022-08-12 RX ADMIN — FENTANYL CITRATE 50 MCG: 50 INJECTION INTRAMUSCULAR; INTRAVENOUS at 11:47

## 2022-08-12 RX ADMIN — FENTANYL CITRATE 50 MCG: 50 INJECTION INTRAMUSCULAR; INTRAVENOUS at 12:10

## 2022-08-12 RX ADMIN — ONDANSETRON 4 MG: 2 INJECTION INTRAMUSCULAR; INTRAVENOUS at 10:33

## 2022-08-12 RX ADMIN — FENTANYL CITRATE 50 MCG: 50 INJECTION INTRAMUSCULAR; INTRAVENOUS at 10:36

## 2022-08-12 RX ADMIN — PROPOFOL 50 MG: 10 INJECTION, EMULSION INTRAVENOUS at 10:27

## 2022-08-12 RX ADMIN — FENTANYL CITRATE 50 MCG: 50 INJECTION INTRAMUSCULAR; INTRAVENOUS at 10:30

## 2022-08-12 RX ADMIN — SODIUM CHLORIDE, SODIUM LACTATE, POTASSIUM CHLORIDE, AND CALCIUM CHLORIDE 125 ML/HR: .6; .31; .03; .02 INJECTION, SOLUTION INTRAVENOUS at 08:39

## 2022-08-12 RX ADMIN — FENTANYL CITRATE 50 MCG: 50 INJECTION INTRAMUSCULAR; INTRAVENOUS at 10:42

## 2022-08-12 RX ADMIN — PROPOFOL 200 MG: 10 INJECTION, EMULSION INTRAVENOUS at 10:25

## 2022-08-12 RX ADMIN — LIDOCAINE HYDROCHLORIDE 50 MG: 10 INJECTION, SOLUTION EPIDURAL; INFILTRATION; INTRACAUDAL; PERINEURAL at 10:25

## 2022-08-12 NOTE — INTERVAL H&P NOTE
H&P reviewed  After examining the patient I find no changes in the patients condition since the H&P had been written      Vitals:    08/12/22 0834   BP: 122/75   Pulse: 83   Resp: 16   Temp: 97 5 °F (36 4 °C)   SpO2: 100%

## 2022-08-12 NOTE — DISCHARGE INSTR - AVS FIRST PAGE
1  Take ibuprofen, 600 mg, 3 times a day regularly  2  Take Percocet, in addition ibuprofen, if you need further pain control  3  Keep incision clean and dry for 2 days  After 2 days, it may get wet in the shower  No dressing is required  4  If you do not already have an appointment, call my office at [phone] for appointment to be seen in about 10-14 days

## 2022-08-12 NOTE — OP NOTE
OPERATIVE REPORT  PATIENT NAME: Lubna Medrano    :  1985  MRN: [id number]  Pt Location: [facility] OR ROOM 01    SURGERY DATE: 2022    Surgeon(s) and Role:     * Prateek Kinsey MD - Primary     * Jaye Mcdonald PA-C - Assisting    Preop Diagnosis:  Indirect right inguinal hernia [K40 90]    Post-Op Diagnosis Codes:     * Indirect right inguinal hernia [K40 90]    Procedure(s) (LRB):  REPAIR HERNIA INGUINAL WITH MESH (Right)    Specimen(s):  ID Type Source Tests Collected by Time Destination   1 :  Tissue Hernia Sac, Right Inguinal TISSUE EXAM Prateek Kinsey MD 2022 1046        Estimated Blood Loss:   Minimal    Drains:  * No LDAs found *    Anesthesia Type:   General    Operative Indications: Indirect right inguinal hernia [K40 90]      Operative Findings:  Indirect inguinal hernia on the right    Complications:   None    Procedure and Technique:  Right inguinal hernia repair with mesh  Patient was taken back to main operating room, placed supine on the operating table, general anesthesia was induced, and the right groin was prepped and draped in normal fashion  Utilizing an incision superior to the medial aspect of the inguinal ligament, skin was incised  Soft tissue was divided Bovie cautery  The external oblique aponeurosis was opened in the direction of its fibers and the ilioinguinal nerve was sacrificed  A Penrose drain was placed around all spermatic cord contents  The inguinal canal floor was noted to be mildly lax  The cremaster muscles were opened and a indirect hernia sac was dissected free from the spermatic cord  This underwent high ligation with 2-0 silk and the distal portion was excised and sent off the field as a specimen  The inguinal canal floor was reconstructed with polypropylene mesh  This was sewn in a continuous fashion to the shelving edge of the inguinal ligament inferior and laterally as well as to the conjoined tendon superior medially    Tails of the mesh were wrapped around spermatic cord to fashion a new deep ring  The areas copiously irrigated  The spermatic cord was returned to its anatomical position the inguinal canal   The external oblique aponeurosis were closed 2-0 Vicryl  3-0 Vicryl was used to approximate Maria A's fascia as well as the dermis  4-0 Monocryl was used to approximate skin edges  Exofin was placed as a dressing  A large field block was created at the end of the case for postop anesthesia  The patient awoke from general anesthesia, was extubated in the operating room, sent to the PACU in stable condition  SHELTON Whaley was required for technical assistance and retraction       I was present for the entire procedure, A qualified resident physician was not available and A physician assistant was required during the procedure for retraction tissue handling,dissection and suturing    Patient Disposition:  PACU       SIGNATURE: Lloyd Triplett MD  DATE: August 12, 2022  TIME: 11:01 AM none

## 2022-08-30 ENCOUNTER — OFFICE VISIT (OUTPATIENT)
Dept: SURGERY | Facility: CLINIC | Age: 37
End: 2022-08-30

## 2022-08-30 VITALS — TEMPERATURE: 97.2 F | WEIGHT: 149 LBS | BODY MASS INDEX: 22.07 KG/M2 | HEIGHT: 69 IN

## 2022-08-30 DIAGNOSIS — Z09 POSTOPERATIVE EXAMINATION: Primary | ICD-10-CM

## 2022-08-30 PROBLEM — K40.90 INDIRECT RIGHT INGUINAL HERNIA: Status: RESOLVED | Noted: 2022-08-12 | Resolved: 2022-08-30

## 2022-08-30 PROCEDURE — 99024 POSTOP FOLLOW-UP VISIT: CPT | Performed by: SURGERY

## 2022-08-30 RX ORDER — OXYCODONE HYDROCHLORIDE 15 MG/1
15 TABLET ORAL EVERY 8 HOURS PRN
COMMUNITY
Start: 2022-08-25

## 2022-08-30 NOTE — PROGRESS NOTES
Patient is status post right inguinal hernia repair 12 August 2022  Minimal pain and no wound issues  Tolerating regular diet with normal bowel function  Incision healing well  No signs of infection  Patient counseled  Follow up p r n

## 2022-11-19 ENCOUNTER — HOSPITAL ENCOUNTER (EMERGENCY)
Facility: HOSPITAL | Age: 37
Discharge: HOME | End: 2022-11-20
Attending: EMERGENCY MEDICINE

## 2022-11-19 VITALS
HEIGHT: 69 IN | HEART RATE: 60 BPM | SYSTOLIC BLOOD PRESSURE: 140 MMHG | DIASTOLIC BLOOD PRESSURE: 69 MMHG | RESPIRATION RATE: 16 BRPM | OXYGEN SATURATION: 100 % | WEIGHT: 150 LBS | BODY MASS INDEX: 22.22 KG/M2 | TEMPERATURE: 98.5 F

## 2022-11-19 DIAGNOSIS — S01.112A EYEBROW LACERATION, LEFT, INITIAL ENCOUNTER: Primary | ICD-10-CM

## 2022-11-20 RX ORDER — GINSENG 100 MG
1 CAPSULE ORAL ONCE
Status: COMPLETED | OUTPATIENT
Start: 2022-11-20 | End: 2022-11-20

## 2022-11-20 RX ORDER — LIDOCAINE HYDROCHLORIDE 10 MG/ML
10 INJECTION, SOLUTION EPIDURAL; INFILTRATION; INTRACAUDAL; PERINEURAL ONCE
Status: COMPLETED | OUTPATIENT
Start: 2022-11-20 | End: 2022-11-20

## 2022-11-20 RX ADMIN — BACITRACIN ZINC 1 SMALL APPLICATION: 500 OINTMENT TOPICAL at 00:52

## 2022-11-20 RX ADMIN — LIDOCAINE HYDROCHLORIDE 10 ML: 10 INJECTION, SOLUTION EPIDURAL; INFILTRATION; INTRACAUDAL at 00:51
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
## 2024-07-22 ENCOUNTER — OFFICE VISIT (OUTPATIENT)
Age: 39
End: 2024-07-22

## 2024-07-22 VITALS
SYSTOLIC BLOOD PRESSURE: 123 MMHG | OXYGEN SATURATION: 95 % | WEIGHT: 158.5 LBS | HEIGHT: 69 IN | TEMPERATURE: 97.9 F | BODY MASS INDEX: 23.47 KG/M2 | DIASTOLIC BLOOD PRESSURE: 67 MMHG | HEART RATE: 96 BPM

## 2024-07-22 DIAGNOSIS — R63.4 WEIGHT LOSS: ICD-10-CM

## 2024-07-22 DIAGNOSIS — Z72.0 VAPES NICOTINE CONTAINING SUBSTANCE: ICD-10-CM

## 2024-07-22 DIAGNOSIS — B35.3 TINEA PEDIS OF RIGHT FOOT: ICD-10-CM

## 2024-07-22 DIAGNOSIS — F41.9 ANXIETY AND DEPRESSION: ICD-10-CM

## 2024-07-22 DIAGNOSIS — F32.A ANXIETY AND DEPRESSION: ICD-10-CM

## 2024-07-22 DIAGNOSIS — Z13.6 SCREENING FOR CARDIOVASCULAR CONDITION: ICD-10-CM

## 2024-07-22 DIAGNOSIS — Z00.00 ANNUAL PHYSICAL EXAM: Primary | ICD-10-CM

## 2024-07-22 PROCEDURE — 99385 PREV VISIT NEW AGE 18-39: CPT | Performed by: FAMILY MEDICINE

## 2024-07-22 PROCEDURE — 99204 OFFICE O/P NEW MOD 45 MIN: CPT | Performed by: FAMILY MEDICINE

## 2024-07-22 RX ORDER — PRENATAL VIT 91/IRON/FOLIC/DHA 28-975-200
COMBINATION PACKAGE (EA) ORAL 2 TIMES DAILY
Qty: 42 G | Refills: 0 | Status: SHIPPED | OUTPATIENT
Start: 2024-07-22

## 2024-07-22 NOTE — ASSESSMENT & PLAN NOTE
Pt reports he has lost weight however on chart has gained weight since last checked in 2022. BMI 23.41 normal range. Pt reports poor appetite/difficult eater/not liking various foods like red meat or vegetables due to taste or texture. Continue to monitor closely and check weight. Advised protein drink supplementation if necessary. Adequate hydration, pt drinks plenty of water and fresh lemonade   Labwork ordered

## 2024-07-22 NOTE — PATIENT INSTRUCTIONS
"Patient Education     Routine physical for adults   The Basics   Written by the doctors and editors at [facility]   What is a physical? -- A physical is a routine visit, or \"check-up,\" with your doctor. You might also hear it called a \"wellness visit\" or \"preventive visit.\"  During each visit, the doctor will:   Ask about your physical and mental health   Ask about your habits, behaviors, and lifestyle   Do an exam   Give you vaccines if needed   Talk to you about any medicines you take   Give advice about your health   Answer your questions  Getting regular check-ups is an important part of taking care of your health. It can help your doctor find and treat any problems you have. But it's also important for preventing health problems.  A routine physical is different from a \"sick visit.\" A sick visit is when you see a doctor because of a health concern or problem. Since physicals are scheduled ahead of time, you can think about what you want to ask the doctor.  How often should I get a physical? -- It depends on your age and health. In general, for people age 21 years and older:   If you are younger than 50 years, you might be able to get a physical every 3 years.   If you are 50 years or older, your doctor might recommend a physical every year.  If you have an ongoing health condition, like diabetes or high blood pressure, your doctor will probably want to see you more often.  What happens during a physical? -- In general, each visit will include:   Physical exam - The doctor or nurse will check your height, weight, heart rate, and blood pressure. They will also look at your eyes and ears. They will ask about how you are feeling and whether you have any symptoms that bother you.   Medicines - It's a good idea to bring a list of all the medicines you take to each doctor visit. Your doctor will talk to you about your medicines and answer any questions. Tell them if you are having any side effects that bother you. You " "should also tell them if you are having trouble paying for any of your medicines.   Habits and behaviors - This includes:   Your diet   Your exercise habits   Whether you smoke, drink alcohol, or use drugs   Whether you are sexually active   Whether you feel safe at home  Your doctor will talk to you about things you can do to improve your health and lower your risk of health problems. They will also offer help and support. For example, if you want to quit smoking, they can give you advice and might prescribe medicines. If you want to improve your diet or get more physical activity, they can help you with this, too.   Lab tests, if needed - The tests you get will depend on your age and situation. For example, your doctor might want to check your:   Cholesterol   Blood sugar   Iron level   Vaccines - The recommended vaccines will depend on your age, health, and what vaccines you already had. Vaccines are very important because they can prevent certain serious or deadly infections.   Discussion of screening - \"Screening\" means checking for diseases or other health problems before they cause symptoms. Your doctor can recommend screening based on your age, risk, and preferences. This might include tests to check for:   Cancer, such as breast, prostate, cervical, ovarian, colorectal, prostate, lung, or skin cancer   Sexually transmitted infections, such as chlamydia and gonorrhea   Mental health conditions like depression and anxiety  Your doctor will talk to you about the different types of screening tests. They can help you decide which screenings to have. They can also explain what the results might mean.   Answering questions - The physical is a good time to ask the doctor or nurse questions about your health. If needed, they can refer you to other doctors or specialists, too.  Adults older than 65 years often need other care, too. As you get older, your doctor will talk to you about:   How to prevent falling at " home   Hearing or vision tests   Memory testing   How to take your medicines safely   Making sure that you have the help and support you need at home  All topics are updated as new evidence becomes available and our peer review process is complete.  This topic retrieved from [employer] on: May 02, 2024.  Topic 929933 Version 1.0  Release: 32.4.3 - C32.122  © 2024 UpToDate, Inc. and/or its affiliates. All rights reserved.  Consumer Information Use and Disclaimer   Disclaimer: This generalized information is a limited summary of diagnosis, treatment, and/or medication information. It is not meant to be comprehensive and should be used as a tool to help the user understand and/or assess potential diagnostic and treatment options. It does NOT include all information about conditions, treatments, medications, side effects, or risks that may apply to a specific patient. It is not intended to be medical advice or a substitute for the medical advice, diagnosis, or treatment of a health care provider based on the health care provider's examination and assessment of a patient's specific and unique circumstances. Patients must speak with a health care provider for complete information about their health, medical questions, and treatment options, including any risks or benefits regarding use of medications. This information does not endorse any treatments or medications as safe, effective, or approved for treating a specific patient. UpToDate, Inc. and its affiliates disclaim any warranty or liability relating to this information or the use thereof.The use of this information is governed by the Terms of Use, available at https://www.woltersSensGarduwer.com/en/know/clinical-effectiveness-terms. 2024© UpToDate, Inc. and its affiliates and/or licensors. All rights reserved.  Copyright   © 2024 UpToDate, Inc. and/or its affiliates. All rights reserved.

## 2024-07-22 NOTE — ASSESSMENT & PLAN NOTE
Significant depression and anxiety. Positive PHQ-9 and NATE-7. Pt and partner are aware. Flat affect, denies thoughts of self harm or suicidal ideation at this time. Feels he has adequate support with his partner. Currently looking for therapist, psychiatrist in [place]. Per partner there is an issue with pt's insurance and location because she currently lives in [place] while he is in [place]. Accepted referral to psych services and assistance from social work for scheduling appt and mental health resources.   They would like to defer treatment at this time and await establishing with psych. Did briefly discuss medication and talk therapy options with maintenance or as needed medications. Pt is uninterested. Partner said he is not big on medications. Continue to be available to provide support and resources.

## 2024-07-22 NOTE — ASSESSMENT & PLAN NOTE
Vapes daily. Not interested in quitting. Brief counseling to recommend quitting and available for resources if requested provided

## 2024-07-22 NOTE — ASSESSMENT & PLAN NOTE
Scaly cracked skin between R 4th and 5th toes consistent with athlete's foot. Previously used OTC cream which helped but has returned and pt feels uncomfortable. Start terbinafine cream BID. Continue adequate hygiene, making sure to pat dry and avoid moisture trapping and excess sweat as able.

## 2024-07-22 NOTE — PROGRESS NOTES
Adult Annual Physical  Name: Chriss Nelson      : 1985      MRN: [id number]  Encounter Provider: Geraldine Dumont MD  Encounter Date: 2024   Encounter department: [facility]    Assessment & Plan   1. Annual physical exam  2. Anxiety and depression  Assessment & Plan:  Significant depression and anxiety. Positive PHQ-9 and NATE-7. Pt and partner are aware. Flat affect, denies thoughts of self harm or suicidal ideation at this time. Feels he has adequate support with his partner. Currently looking for therapist, psychiatrist in [place]. Per partner there is an issue with pt's insurance and location because she currently lives in [place] while he is in [place]. Accepted referral to psych services and assistance from social work for scheduling appt and mental health resources.   They would like to defer treatment at this time and await establishing with psych. Did briefly discuss medication and talk therapy options with maintenance or as needed medications. Pt is uninterested. Partner said he is not big on medications. Continue to be available to provide support and resources.   Orders:  -     Ambulatory referral to Psych Services; Future  -     Ambulatory Referral to Social Work Care Management Program; Future  3. Screening for cardiovascular condition  -     Comprehensive metabolic panel; Future  -     Lipid panel; Future  -     Comprehensive metabolic panel  -     Lipid panel  4. Weight loss  Assessment & Plan:  Pt reports he has lost weight however on chart has gained weight since last checked in . BMI 23.41 normal range. Pt reports poor appetite/difficult eater/not liking various foods like red meat or vegetables due to taste or texture. Continue to monitor closely and check weight. Advised protein drink supplementation if necessary. Adequate hydration, pt drinks plenty of water and fresh lemonade   Labwork ordered  Orders:  -     TSH, 3rd generation with Free  T4 reflex; Future  -     TSH, 3rd generation with Free T4 reflex  5. Tinea pedis of right foot  Assessment & Plan:  Scaly cracked skin between R 4th and 5th toes consistent with athlete's foot. Previously used OTC cream which helped but has returned and pt feels uncomfortable. Start terbinafine cream BID. Continue adequate hygiene, making sure to pat dry and avoid moisture trapping and excess sweat as able.   Orders:  -     terbinafine (LamISIL) 1 % cream; Apply topically 2 (two) times a day  6. Vapes nicotine containing substance  Assessment & Plan:  Vapes daily. Not interested in quitting. Brief counseling to recommend quitting and available for resources if requested provided    Immunizations and preventive care screenings were discussed with patient today. Appropriate education was printed on patient's after visit summary.    Counseling:  Alcohol/drug use: discussed moderation in alcohol intake, the recommendations for healthy alcohol use, and avoidance of illicit drug use.  Dental Health: discussed importance of regular tooth brushing, flossing, and dental visits.  Injury prevention: discussed safety/seat belts, safety helmets, smoke detectors, carbon dioxide detectors, and smoking near bedding or upholstery.  Sexual health: discussed sexually transmitted diseases, partner selection, use of condoms, avoidance of unintended pregnancy, and contraceptive alternatives.  Exercise: the importance of regular exercise/physical activity was discussed. Recommend exercise 3-5 times per week for at least 30 minutes.   Immunizations: Pt declined COVID and pneumococcal vaccines today. Pt reports he thinks he received one dose pneumococcal vaccine while incarcerated.   Screenings: Labwork ordered       Depression Screening and Follow-up Plan: Patient's depression screening was positive with a PHQ-2 score of 3. Their PHQ-9 score was 24. Patient assessed for underlying major depression. Brief counseling provided and recommend  additional follow-up/re-evaluation next office visit. See A&P    Tobacco Cessation Counseling: Tobacco cessation counseling was provided. The patient is sincerely urged to quit consumption of tobacco. He is not ready to quit tobacco. Medication options and side effects of medication not discussed. Patient refused medication. Patient vapes daily. He is not interested in quitting at this time.        History of Present Illness     Adult Annual Physical:  Patient presents for annual physical. 39 yo male presenting for annual physical.    Concern about R foot started 2 months ago. Pt calls it Athlete's foot, put some cream on it went away in 2 weeks however now returning? Still sore. R small toe, in between 4th and 5th toe.  No trauma that he knows. Thinks it was the infection, doesn't think it got treated properly, thinks it was still there. .     Diet and Physical Activity:  - Diet/Nutrition: poor diet. difficult, doesn't like eating many things like meats, vegetables. feels sick when eating certain things. likes turkey ariza, peanut butter and jelly, and fish. eats once a day to eat not to feel full. drinks fresh lemonade and water. protein drinks.  - Exercise: no formal exercise. works as home health care, active    Depression Screening:  - PHQ-2 Score: 3  - PHQ-9 Score: 24    General Health:  - Sleep: sleeps well and 4-6 hours of sleep on average. never feel refreshed. feels he gets enough sleep  - Hearing: normal hearing bilateral ears.  - Vision: no vision problems and wears glasses. [facility], due for eye exam. will return to previous eye location  - Dental: regular dental visits and brushes teeth twice daily.     Health:  - History of STDs: no.   - Urinary symptoms: none.     Advanced Care Planning:  - Has an advanced directive?: no    - Has a durable medical POA?: no    - ACP document given to patient?: yes      PHQ-2/9 Depression Screening    Little interest or pleasure  in doing things: 0 - not at all  Feeling down, depressed, or hopeless: 3 - nearly every day  Trouble falling or staying asleep, or sleeping too much: 3 - nearly every day  Feeling tired or having little energy: 3 - nearly every day  Poor appetite or overeating: 3 - nearly every day  Feeling bad about yourself - or that you are a failure or have let yourself or your family down: 3 - nearly every day  Trouble concentrating on things, such as reading the newspaper or watching television: 3 - nearly every day  Moving or speaking so slowly that other people could have noticed. Or the opposite - being so fidgety or restless that you have been moving around a lot more than usual: 3 - nearly every day  Thoughts that you would be better off dead, or of hurting yourself in some way: 3 - nearly every day  PHQ-2 Score: 3  PHQ-2 Interpretation: POSITIVE depression screen  PHQ-9 Score: 24  PHQ-9 Interpretation: Severe depression         NATE-7 Flowsheet Screening      Flowsheet Row Most Recent Value   Over the last two weeks, how often have you been bothered by the following problems?     Feeling nervous, anxious, or on edge 3   Not being able to stop or control worrying 3   Worrying too much about different things 3   Trouble relaxing  3   Being so restless that it's hard to sit still 3   Becoming easily annoyed or irritable  3   Feeling afraid as if something awful might happen 3   How difficult have these problems made it for you to do your work, take care of things at home, or get along with other people?  Somewhat difficult   NATE Score  21              Review of Systems   Constitutional:  Positive for unexpected weight change. Negative for chills and fever.   HENT:  Negative for ear pain and sore throat.    Eyes:  Negative for pain and visual disturbance.   Respiratory:  Negative for cough and shortness of breath.    Cardiovascular:  Negative for chest pain and palpitations.   Gastrointestinal:  Negative for abdominal pain  and vomiting.   Genitourinary:  Negative for dysuria and hematuria.   Musculoskeletal:  Negative for arthralgias and back pain.   Skin:  Positive for rash. Negative for color change.   Neurological:  Negative for seizures and syncope.   Psychiatric/Behavioral:  Positive for dysphoric mood. Negative for self-injury and suicidal ideas. The patient is nervous/anxious.    All other systems reviewed and are negative.    Medical History Reviewed by provider this encounter:       Past Medical History   Past Medical History:   Diagnosis Date    Asthma     Chronic pain disorder     Indirect right inguinal hernia 8/12/2022    PTSD (post-traumatic stress disorder)      Past Surgical History:   Procedure Laterality Date    HAND SURGERY      GA RPR 1ST INGUN HRNA AGE 5 YRS/> REDUCIBLE Right 8/12/2022    Procedure: REPAIR HERNIA INGUINAL WITH MESH;  Surgeon: Josiah Duval MD;  [facility];  Service: General     Family History   Problem Relation Age of Onset    Stroke Father     Glaucoma Father      Current Outpatient Medications on File Prior to Visit   Medication Sig Dispense Refill    meloxicam (MOBIC) 7.5 mg tablet Take 7.5 mg by mouth 2 (two) times a day with meals      oxyCODONE (ROXICODONE) 15 mg immediate release tablet Take 15 mg by mouth every 8 (eight) hours as needed      tiZANidine (ZANAFLEX) 4 mg tablet 4 mg 2 (two) times a day      Diclofenac Sodium (VOLTAREN) 1 % APPLY SPARINGLY TO THE AFFECTED AREA TWICE A DAY AS NEEDED (Patient not taking: Reported on 7/22/2024)      oxyCODONE-acetaminophen (Percocet) 5-325 mg per tablet Take 1 tablet by mouth every 4 (four) hours as needed for moderate pain for up to 8 doses Max Daily Amount: 6 tablets (Patient not taking: Reported on 8/30/2022) 8 tablet 0     No current facility-administered medications on file prior to visit.   No Known Allergies   Current Outpatient Medications on File Prior to Visit   Medication Sig Dispense Refill    meloxicam (MOBIC) 7.5 mg  "tablet Take 7.5 mg by mouth 2 (two) times a day with meals      oxyCODONE (ROXICODONE) 15 mg immediate release tablet Take 15 mg by mouth every 8 (eight) hours as needed      tiZANidine (ZANAFLEX) 4 mg tablet 4 mg 2 (two) times a day      Diclofenac Sodium (VOLTAREN) 1 % APPLY SPARINGLY TO THE AFFECTED AREA TWICE A DAY AS NEEDED (Patient not taking: Reported on 7/22/2024)      oxyCODONE-acetaminophen (Percocet) 5-325 mg per tablet Take 1 tablet by mouth every 4 (four) hours as needed for moderate pain for up to 8 doses Max Daily Amount: 6 tablets (Patient not taking: Reported on 8/30/2022) 8 tablet 0     No current facility-administered medications on file prior to visit.      Social History     Tobacco Use    Smoking status: Every Day     Types: E-Cigarettes    Smokeless tobacco: Never    Tobacco comments:     Pt uses vape pen   Vaping Use    Vaping status: Every Day    Substances: Nicotine   Substance and Sexual Activity    Alcohol use: Never    Drug use: Yes     Types: Marijuana     Comment: Pt has medical marijuana card    Sexual activity: Yes       Objective     /67 (BP Location: Left arm, Patient Position: Sitting, Cuff Size: Standard)   Pulse 96   Temp 97.9 °F (36.6 °C)   Ht 5' 9\" (1.753 m)   Wt 71.9 kg (158 lb 8 oz)   SpO2 95%   BMI 23.41 kg/m²     Physical Exam  Vitals reviewed.   Constitutional:       General: He is not in acute distress.     Appearance: Normal appearance. He is well-developed.   HENT:      Head: Normocephalic and atraumatic.      Right Ear: External ear normal.      Left Ear: External ear normal.      Nose: Nose normal.      Mouth/Throat:      Mouth: Mucous membranes are moist.      Pharynx: Oropharynx is clear.   Eyes:      Extraocular Movements: Extraocular movements intact.      Conjunctiva/sclera: Conjunctivae normal.   Cardiovascular:      Rate and Rhythm: Normal rate and regular rhythm.      Pulses: Normal pulses.      Heart sounds: Normal heart sounds. No murmur " heard.  Pulmonary:      Effort: Pulmonary effort is normal. No respiratory distress.      Breath sounds: Normal breath sounds. No wheezing or rales.   Abdominal:      General: Bowel sounds are normal.      Palpations: Abdomen is soft.      Tenderness: There is no abdominal tenderness.   Musculoskeletal:         General: No swelling.      Cervical back: Neck supple.   Skin:     General: Skin is warm and dry.      Capillary Refill: Capillary refill takes less than 2 seconds.      Findings: Rash present.      Comments: Scaling cracked skin in between R 4th and 5th toes and underneath, some underlying erythema, no significant warmth, drainage, or tenderness  Consistent with athlete's foot   Neurological:      General: No focal deficit present.      Mental Status: He is alert. Mental status is at baseline.   Psychiatric:      Comments: Flat affect. Overall seems uninterested in conversation and refers to partner sitting next to him to answer some questions.            Geraldine Dumont MD  [facility]  Date: 7/22/2024 Time: 5:56 PM

## 2024-07-23 ENCOUNTER — TELEPHONE (OUTPATIENT)
Age: 39
End: 2024-07-23

## 2024-07-23 NOTE — TELEPHONE ENCOUNTER
Spoke to pt regarding routine referral. Confirmed insurance with pt. Insurance is OON. Informed pt to reach out to insurance company for in network providers. Referral closed.

## 2024-08-01 ENCOUNTER — PATIENT OUTREACH (OUTPATIENT)
Age: 39
End: 2024-08-01

## 2024-08-01 NOTE — PROGRESS NOTES
SWCM received referral from provider to assist patient with needs, [facility] Tx resources.     SWCM completed chart review. Per chart, patient with significant depression and anxiety. Positive PHQ-9 and NATE-7. Pt and partner are aware. Per chart, patient with flat affect, denies thoughts of self harm or suicidal ideation SWCM called patient to follow up and assist with needs.     SWCM called patient to follow up and assist with needs. SWCM spoke to patient. SWCM introduced self, role and reason for outreach. Contact information provided.    Patient states he is interested in counseling services. Patient's girlfriend, Maru, present and providing input during outreach. Patient expressed interest in [facility] Tx via telehealth. SWCM provided the following:    [facility] [place]  [phone]    SWCM encouraged patient and Maru to call with questions/ needs. SWCM will continue to follow up and remain available to assist as needed.

## 2024-08-21 ENCOUNTER — PATIENT OUTREACH (OUTPATIENT)
Age: 39
End: 2024-08-21

## 2024-08-21 NOTE — PROGRESS NOTES
SWCM called patient to follow up and assist with needs. SWCM spoke to patient's partner, Maru.     Maru reports patient contact with OP Tx resource provided, [facility] [place]. Patient provided necessary documentation. Maru to follow up with organization regarding scheduling counseling session. Maru reports no other needs currently. SWCM encouraged patient and Maru to call with questions/ needs. SWCM will remain available to assist as needed.    SWCM will be closing case at this time and removing self from care team.

## 2024-08-27 ENCOUNTER — OFFICE VISIT (OUTPATIENT)
Age: 39
End: 2024-08-27

## 2024-08-27 VITALS
WEIGHT: 153 LBS | SYSTOLIC BLOOD PRESSURE: 170 MMHG | OXYGEN SATURATION: 99 % | BODY MASS INDEX: 22.59 KG/M2 | DIASTOLIC BLOOD PRESSURE: 80 MMHG | HEART RATE: 80 BPM | TEMPERATURE: 98 F

## 2024-08-27 DIAGNOSIS — F41.9 ANXIETY AND DEPRESSION: Primary | ICD-10-CM

## 2024-08-27 DIAGNOSIS — B35.3 TINEA PEDIS OF RIGHT FOOT: ICD-10-CM

## 2024-08-27 DIAGNOSIS — F32.A ANXIETY AND DEPRESSION: Primary | ICD-10-CM

## 2024-08-27 PROCEDURE — 99213 OFFICE O/P EST LOW 20 MIN: CPT | Performed by: FAMILY MEDICINE

## 2024-08-27 RX ORDER — ESCITALOPRAM OXALATE 10 MG/1
10 TABLET ORAL DAILY
Qty: 60 TABLET | Refills: 0 | Status: SHIPPED | OUTPATIENT
Start: 2024-08-27 | End: 2024-10-26

## 2024-08-27 NOTE — ASSESSMENT & PLAN NOTE
Pt has cracked skin in between 4th and 5th digit since the last month. He has tried terbinafine cream BID which has provided minimal relief. He still continues to wear shoes, and socks all day.    Reminded patient that is it very important to let the area dry and decrease moisture in that area especially during summer time  Apply terbinafine BID and keep the area dry  Return if it worsens.

## 2024-08-27 NOTE — PROGRESS NOTES
Ambulatory Visit  Name: Chriss Nelson      : 1985      MRN: [id number]  Encounter Provider: Aziza Bullock MD  Encounter Date: 2024   Encounter department: [facility]    Assessment & Plan   1. Anxiety and depression  Assessment & Plan:  Pt continues to have significant depression and anxiety. PHQ-9 and GAD7 are both positive. Pt states that he last felt he was at peace when he was back in [place] in 2019. Pt's wife takes care of his medications and pt is currently on a wait list to see a psychiatrist. At this time pt is willing to try a medication to help with his depression. He denies any suicidal ideation or homicidal ideations.     Start lexapro daily 10mg   Explained to patients how this medication works and told them that it will take 2-3 weeks to show any benefit. But he should continue to take to daily.   RTO in 6 weeks   Orders:  -     escitalopram (Lexapro) 10 mg tablet; Take 1 tablet (10 mg total) by mouth daily  2. Tinea pedis of right foot  Assessment & Plan:  Pt has cracked skin in between 4th and 5th digit since the last month. He has tried terbinafine cream BID which has provided minimal relief. He still continues to wear shoes, and socks all day.    Reminded patient that is it very important to let the area dry and decrease moisture in that area especially during summer time  Apply terbinafine BID and keep the area dry  Return if it worsens.        History of Present Illness     HPI    Pt presents for a follow up of right foot pain, depression, anxiety    Review of Systems   Constitutional:  Negative for chills and fever.   Gastrointestinal:  Negative for abdominal pain and vomiting.   Genitourinary:  Negative for dysuria and hematuria.   Musculoskeletal:  Negative for arthralgias and back pain.   Skin:  Positive for rash. Negative for color change and wound.   All other systems reviewed and are negative.      Objective     /80 (BP  Location: Right arm, Patient Position: Sitting, Cuff Size: Standard)   Pulse 80   Temp 98 °F (36.7 °C) (Tympanic)   Wt 69.4 kg (153 lb)   SpO2 99%   BMI 22.59 kg/m²     Physical Exam  Constitutional:       Appearance: Normal appearance.   Cardiovascular:      Rate and Rhythm: Normal rate and regular rhythm.      Pulses: Normal pulses.      Heart sounds: Normal heart sounds. No murmur heard.     No friction rub.   Pulmonary:      Effort: Pulmonary effort is normal. No respiratory distress.      Breath sounds: Normal breath sounds. No stridor. No wheezing or rhonchi.   Abdominal:      General: Bowel sounds are normal. There is no distension.      Palpations: Abdomen is soft. There is no mass.      Tenderness: There is no abdominal tenderness.   Skin:     General: Skin is warm.      Capillary Refill: Capillary refill takes less than 2 seconds.      Coloration: Skin is not pale.      Findings: Rash (fungal 4th and 5th right food) present. No erythema.   Neurological:      General: No focal deficit present.      Mental Status: He is alert and oriented to person, place, and time. Mental status is at baseline.   Psychiatric:         Mood and Affect: Mood normal.         Behavior: Behavior normal.         Thought Content: Thought content normal.         Judgment: Judgment normal.

## 2024-08-27 NOTE — ASSESSMENT & PLAN NOTE
Pt continues to have significant depression and anxiety. PHQ-9 and GAD7 are both positive. Pt states that he last felt he was at peace [place]. Pt's wife takes care of his medications and pt is currently on a wait list to see a psychiatrist. At this time pt is willing to try a medication to help with his depression. He denies any suicidal ideation or homicidal ideations.     Start lexapro daily 10mg   Explained to patients how this medication works and told them that it will take 2-3 weeks to show any benefit. But he should continue to take to daily.   RTO in 6 weeks

## 2024-12-27 ENCOUNTER — OFFICE VISIT (OUTPATIENT)
Age: 39
End: 2024-12-27

## 2024-12-27 VITALS
OXYGEN SATURATION: 99 % | DIASTOLIC BLOOD PRESSURE: 90 MMHG | WEIGHT: 156 LBS | SYSTOLIC BLOOD PRESSURE: 148 MMHG | RESPIRATION RATE: 18 BRPM | BODY MASS INDEX: 23.11 KG/M2 | HEIGHT: 69 IN | HEART RATE: 62 BPM

## 2024-12-27 DIAGNOSIS — I10 HYPERTENSION, UNSPECIFIED TYPE: Primary | ICD-10-CM

## 2024-12-27 DIAGNOSIS — R04.0 NOSEBLEED: ICD-10-CM

## 2024-12-27 PROCEDURE — 99213 OFFICE O/P EST LOW 20 MIN: CPT | Performed by: FAMILY MEDICINE

## 2024-12-27 RX ORDER — AMLODIPINE BESYLATE 5 MG/1
5 TABLET ORAL DAILY
Qty: 30 TABLET | Refills: 2 | Status: SHIPPED | OUTPATIENT
Start: 2024-12-27

## 2024-12-27 RX ORDER — LAMOTRIGINE 25 MG/1
25 TABLET ORAL DAILY
COMMUNITY

## 2024-12-27 NOTE — PROGRESS NOTES
Name: Chriss Nelson      : 1985      MRN: [id number]  Encounter Provider: Trace Vines MD  Encounter Date: 2024   Encounter department: [facility]  :  Assessment & Plan  Hypertension, unspecified type  Patient presents today with complaint of high blood pressure.  He states that his wife checked his blood pressure at home today and was high at 160/90 mm Hg.  He had few readings of elevated blood pressure in the past but did not want to be started on medication.  Denies any other complaints today.  He states that he has had nosebleeds for the last 2 days; denies any headaches, blurring of vision or endorgan changes.   Blood pressure checked in the office today was initially 147/93 and later 148/90.     Plan  Started patient amlodipine 5 mg daily  Advised patient to continue to check blood pressure at home and keep a log  DASH diet encouraged  Return in 6 weeks for blood pressure recheck  Orders:    amLODIPine (NORVASC) 5 mg tablet; Take 1 tablet (5 mg total) by mouth daily    Nosebleed  Pt states that he has had nosebleeds for the last 2 days.  He denies any recent injury and is not on any blood thinner.  He states that the bleeding was more on the first. The the bleeding subsided today morning    With patient that patient's high blood pressure could have increase the risk of nosebleed.  Also be due to dry ear, nose picking or marijuana use  Supportive measures at home such as leaning a little bit forward and pinching the nostrils closed  Can use decongestant spray or nasal saline spray in both nostrils  Patient report to ER if patient gets another nosebleed that lasts longer than 20 minutes.  Patient that cauterization or nasal packing might be done in the ER           HPI  Review of Systems   Constitutional:  Negative for chills and fever.   HENT:  Positive for nosebleeds. Negative for ear pain and sore throat.    Eyes:  Negative for pain and  "visual disturbance.   Respiratory: Negative.  Negative for cough and shortness of breath.    Cardiovascular: Negative.  Negative for chest pain and palpitations.   Gastrointestinal: Negative.  Negative for abdominal pain and vomiting.   Genitourinary: Negative.  Negative for dysuria and hematuria.   Musculoskeletal:  Negative for arthralgias and back pain.   Skin:  Negative for color change and rash.   Neurological: Negative.  Negative for seizures and syncope.   Hematological: Negative.    All other systems reviewed and are negative.      Objective   /90   Pulse 62   Resp 18   Ht 5' 8.5\" (1.74 m)   Wt 70.8 kg (156 lb)   SpO2 99%   BMI 23.37 kg/m²      Physical Exam  Vitals and nursing note reviewed.   Constitutional:       General: He is not in acute distress.     Appearance: He is well-developed.   HENT:      Head: Normocephalic and atraumatic.   Eyes:      Conjunctiva/sclera: Conjunctivae normal.   Cardiovascular:      Rate and Rhythm: Normal rate and regular rhythm.      Pulses: Normal pulses.      Heart sounds: Normal heart sounds. No murmur heard.     No friction rub. No gallop.   Pulmonary:      Effort: Pulmonary effort is normal. No respiratory distress.      Breath sounds: Normal breath sounds.   Abdominal:      General: Bowel sounds are normal.      Palpations: Abdomen is soft.      Tenderness: There is no abdominal tenderness.   Musculoskeletal:         General: No swelling.      Cervical back: Neck supple.      Right lower leg: No edema.      Left lower leg: No edema.   Skin:     General: Skin is warm and dry.      Capillary Refill: Capillary refill takes less than 2 seconds.   Neurological:      Mental Status: He is alert.   Psychiatric:         Mood and Affect: Mood normal.         "

## 2024-12-27 NOTE — ASSESSMENT & PLAN NOTE
Patient presents today with complaint of high blood pressure.  He states that his wife checked his blood pressure at home today and was high at 160/90 mm Hg.  He had few readings of elevated blood pressure in the past but did not want to be started on medication.  Denies any other complaints today.  He states that he has had nosebleeds for the last 2 days; denies any headaches, blurring of vision or endorgan changes.   Blood pressure checked in the office today was initially 147/93 and later 148/90.     Plan  Started patient amlodipine 5 mg daily  Advised patient to continue to check blood pressure at home and keep a log  DASH diet encouraged  Return in 6 weeks for blood pressure recheck  Orders:    amLODIPine (NORVASC) 5 mg tablet; Take 1 tablet (5 mg total) by mouth daily

## 2025-01-10 ENCOUNTER — TELEPHONE (OUTPATIENT)
Age: 40
End: 2025-01-10

## 2025-01-10 NOTE — TELEPHONE ENCOUNTER
Maru called to request a phone call to discuss BP medication and if a sooner appt is  needed.   [phone]   Adv that last OV physician -is on night floats so I will also attach pcp.   Please Advise, Thank you

## 2025-01-12 NOTE — TELEPHONE ENCOUNTER
Spoke with patient and girfriend. Patient was concerned that he had an elevated BP reading in the office - 140/90 mm Hg. Patient checks his BP at home and Systolic BP values are usually in the range of 120-135. Advised patient to continue to keep a log and bring to next appointment in Feb. If values are high, will increase dose of amlodipine to 10 mg.

## 2025-03-23 DIAGNOSIS — I10 HYPERTENSION, UNSPECIFIED TYPE: ICD-10-CM

## 2025-03-24 RX ORDER — AMLODIPINE BESYLATE 5 MG/1
5 TABLET ORAL DAILY
Qty: 30 TABLET | Refills: 2 | Status: SHIPPED | OUTPATIENT
Start: 2025-03-24

## 2025-05-12 ENCOUNTER — HOSPITAL ENCOUNTER (EMERGENCY)
Facility: HOSPITAL | Age: 40
Discharge: HOME | End: 2025-05-12
Attending: EMERGENCY MEDICINE
Payer: COMMERCIAL

## 2025-05-12 VITALS
HEART RATE: 61 BPM | SYSTOLIC BLOOD PRESSURE: 120 MMHG | DIASTOLIC BLOOD PRESSURE: 79 MMHG | TEMPERATURE: 98.5 F | RESPIRATION RATE: 18 BRPM | OXYGEN SATURATION: 98 %

## 2025-05-12 DIAGNOSIS — F41.9 ANXIETY: ICD-10-CM

## 2025-05-12 DIAGNOSIS — R42 DIZZINESS: Primary | ICD-10-CM

## 2025-05-12 LAB
ALBUMIN SERPL BCG-MCNC: 4.4 G/DL (ref 3.5–5)
ALP SERPL-CCNC: 67 U/L (ref 34–104)
ALT SERPL W P-5'-P-CCNC: 12 U/L (ref 7–52)
ANION GAP SERPL CALCULATED.3IONS-SCNC: 6 MMOL/L (ref 4–13)
AST SERPL W P-5'-P-CCNC: 14 U/L (ref 13–39)
BASOPHILS # BLD AUTO: 0.05 THOUSANDS/ÂΜL (ref 0–0.1)
BASOPHILS NFR BLD AUTO: 1 % (ref 0–1)
BILIRUB SERPL-MCNC: 0.6 MG/DL (ref 0.2–1)
BUN SERPL-MCNC: 13 MG/DL (ref 5–25)
CALCIUM SERPL-MCNC: 9.3 MG/DL (ref 8.4–10.2)
CARDIAC TROPONIN I PNL SERPL HS: 3 NG/L (ref ?–50)
CHLORIDE SERPL-SCNC: 106 MMOL/L (ref 96–108)
CO2 SERPL-SCNC: 28 MMOL/L (ref 21–32)
CREAT SERPL-MCNC: 1.02 MG/DL (ref 0.6–1.3)
EOSINOPHIL # BLD AUTO: 0.22 THOUSAND/ÂΜL (ref 0–0.61)
EOSINOPHIL NFR BLD AUTO: 3 % (ref 0–6)
ERYTHROCYTE [DISTWIDTH] IN BLOOD BY AUTOMATED COUNT: 11.9 % (ref 11.6–15.1)
GFR SERPL CREATININE-BSD FRML MDRD: 92 ML/MIN/1.73SQ M
GLUCOSE SERPL-MCNC: 123 MG/DL (ref 65–140)
HCT VFR BLD AUTO: 39.1 % (ref 36.5–49.3)
HGB BLD-MCNC: 13.7 G/DL (ref 12–17)
IMM GRANULOCYTES # BLD AUTO: 0.02 THOUSAND/UL (ref 0–0.2)
IMM GRANULOCYTES NFR BLD AUTO: 0 % (ref 0–2)
LYMPHOCYTES # BLD AUTO: 3.52 THOUSANDS/ÂΜL (ref 0.6–4.47)
LYMPHOCYTES NFR BLD AUTO: 48 % (ref 14–44)
MCH RBC QN AUTO: 31.6 PG (ref 26.8–34.3)
MCHC RBC AUTO-ENTMCNC: 35 G/DL (ref 31.4–37.4)
MCV RBC AUTO: 90 FL (ref 82–98)
MONOCYTES # BLD AUTO: 0.44 THOUSAND/ÂΜL (ref 0.17–1.22)
MONOCYTES NFR BLD AUTO: 6 % (ref 4–12)
NEUTROPHILS # BLD AUTO: 3.03 THOUSANDS/ÂΜL (ref 1.85–7.62)
NEUTS SEG NFR BLD AUTO: 42 % (ref 43–75)
NRBC BLD AUTO-RTO: 0 /100 WBCS
PLATELET # BLD AUTO: 239 THOUSANDS/UL (ref 149–390)
PMV BLD AUTO: 9.4 FL (ref 8.9–12.7)
POTASSIUM SERPL-SCNC: 3.5 MMOL/L (ref 3.5–5.3)
PROT SERPL-MCNC: 6.5 G/DL (ref 6.4–8.4)
RBC # BLD AUTO: 4.34 MILLION/UL (ref 3.88–5.62)
SODIUM SERPL-SCNC: 140 MMOL/L (ref 135–147)
WBC # BLD AUTO: 7.28 THOUSAND/UL (ref 4.31–10.16)

## 2025-05-12 PROCEDURE — 99284 EMERGENCY DEPT VISIT MOD MDM: CPT

## 2025-05-12 PROCEDURE — 36415 COLL VENOUS BLD VENIPUNCTURE: CPT

## 2025-05-12 PROCEDURE — 84484 ASSAY OF TROPONIN QUANT: CPT

## 2025-05-12 PROCEDURE — 93005 ELECTROCARDIOGRAM TRACING: CPT

## 2025-05-12 PROCEDURE — 80053 COMPREHEN METABOLIC PANEL: CPT

## 2025-05-12 PROCEDURE — 85025 COMPLETE CBC W/AUTO DIFF WBC: CPT

## 2025-05-12 PROCEDURE — 99285 EMERGENCY DEPT VISIT HI MDM: CPT

## 2025-05-13 NOTE — ED PROVIDER NOTES
Time reflects when diagnosis was documented in both MDM as applicable and the Disposition within this note       Time User Action Codes Description Comment    5/12/2025 11:21 PM Sheridan Borjas Add [R42] Dizziness     5/12/2025 11:21 PM Sheridan Borjas Add [F41.9] Anxiety           ED Disposition       ED Disposition   Discharge    Condition   Stable    Date/Time   Mon May 12, 2025 11:21 PM    Comment   Chriss Franco Omar discharge to home/self care.                   Assessment & Plan       Medical Decision Making  Patient seen, examined and noted to have dizziness and anxiety.  Patient coming in with symptoms of intermittent dizziness, numbness and metallic taste in his mouth.  No chest pain or shortness of breath.  No nausea or vomiting.  Does note that few days prior to starting his symptoms he discontinued his Lexapro and his Lamictal.  Cardiac workup here is unremarkable.  Vitals are stable.  EKG showing normal sinus rhythm.  As patient was only taking 10 of Lexapro encouraged him to resume his Lexapro and talk to his psychiatrist about restarting other medications.  Supportive care at home discussed.  Strict ER return precautions were discussed which patient expresses understanding of.    Differential diagnosis includes but is not limited to anxiety, panic attack, substance abuse, depression; doubt suicidal ideation or metabolic abnormality or cardiac etiology      EKG: Was interpreted by myself as above.     Patient appears well, is nontoxic appearing, he expresses understanding and agrees with plan of care at this time.  In light of this patient would benefit from outpatient management.    Patient at time of discharge well-appearing in no acute distress, all questions answered. Patient agreeable to plan.  Patient's vitals, lab/imaging results, diagnosis, and treatment plan were discussed with the patient. All new/changed medications were discussed with patient, specifically, route of administration, how  "often and when to take, and where they can be picked up. Strict return precautions as well as close follow up with PCP was discussed with the patient and the patient was agreeable to my recommendations. Patient verbally acknowledged understanding of the above communications. Strict return precautions were provided. All labs reviewed and utilized in the medical decision making process.    Amount and/or Complexity of Data Reviewed  Labs: ordered.             Medications - No data to display    ED Risk Strat Scores   HEART Risk Score      Flowsheet Row Most Recent Value   Heart Score Risk Calculator    History 0 Filed at: 05/12/2025 2328   ECG 0 Filed at: 05/12/2025 2328   Age 0 Filed at: 05/12/2025 2328   Risk Factors 1 Filed at: 05/12/2025 2328   Troponin 0 Filed at: 05/12/2025 2328   HEART Score 1 Filed at: 05/12/2025 2328          HEART Risk Score      Flowsheet Row Most Recent Value   Heart Score Risk Calculator    History 0 Filed at: 05/12/2025 2328   ECG 0 Filed at: 05/12/2025 2328   Age 0 Filed at: 05/12/2025 2328   Risk Factors 1 Filed at: 05/12/2025 2328   Troponin 0 Filed at: 05/12/2025 2328   HEART Score 1 Filed at: 05/12/2025 2328                      No data recorded                            History of Present Illness       Chief Complaint   Patient presents with    Dizziness     Reports over the past 1.5 weeks having \"waves of dizziness\" and full body numbness, reports metal taste.  Denies CP/SOB/N/V  +diarrhea       Past Medical History:   Diagnosis Date    Asthma     Chronic pain disorder     Indirect right inguinal hernia 8/12/2022    PTSD (post-traumatic stress disorder)       Past Surgical History:   Procedure Laterality Date    HAND SURGERY      NM RPR 1ST INGUN HRNA AGE 5 YRS/> REDUCIBLE Right 8/12/2022    Procedure: REPAIR HERNIA INGUINAL WITH MESH;  Surgeon: Josiah Duval MD;  Location: [facility];  Service: General      Family History   Problem Relation Age of Onset    Stroke Father     " Glaucoma Father       Social History     Tobacco Use    Smoking status: Every Day     Types: E-Cigarettes    Smokeless tobacco: Never    Tobacco comments:     Pt uses vape pen   Vaping Use    Vaping status: Every Day    Substances: Nicotine, THC   Substance Use Topics    Alcohol use: Never    Drug use: Yes     Types: Marijuana     Comment: Pt has medical marijuana card      E-Cigarette/Vaping    E-Cigarette Use Current Every Day User       E-Cigarette/Vaping Substances    Nicotine Yes     THC Yes     CBD No       I have reviewed and agree with the history as documented.     Chriss Nelson is a 39 y.o. male with a PMH of anxiety and depression presenting to the ED on May 12, 2025 with dizziness. Patient endorses that over the last 1 and half weeks he has been having waves of dizziness, full body numbness and a metallic taste in his mouth.  He also notes that 2 weeks ago he stopped taking his Lamictal and Lexapro. Patient denies chest pain, shortness of breath, nausea, vomiting, fevers, chills, headache, weakness, blurry vision or any other complaints at this time.         Dizziness  Associated symptoms: no chest pain, no headaches, no nausea, no palpitations, no shortness of breath, no vomiting and no weakness        Review of Systems   Constitutional:  Negative for chills and fever.   Respiratory:  Negative for cough and shortness of breath.    Cardiovascular:  Negative for chest pain and palpitations.   Gastrointestinal:  Negative for abdominal pain, nausea and vomiting.   Neurological:  Positive for dizziness and numbness. Negative for tremors, syncope, weakness, light-headedness and headaches.           Objective       ED Triage Vitals   Temperature Pulse Blood Pressure Respirations SpO2 Patient Position - Orthostatic VS   05/12/25 2159 05/12/25 2155 05/12/25 2155 05/12/25 2155 05/12/25 2155 --   98.5 °F (36.9 °C) 61 120/79 18 98 %       Temp Source Heart Rate Source BP Location FiO2 (%) Pain Score     05/12/25 2159 -- -- -- --    Oral          Vitals      Date and Time Temp Pulse SpO2 Resp BP Pain Score FACES Pain Rating User   05/12/25 2159 98.5 °F (36.9 °C) -- -- -- -- -- -- SH   05/12/25 2155 -- 61 98 % 18 120/79 -- -- JLZ            Physical Exam  Vitals and nursing note reviewed.   Constitutional:       General: He is not in acute distress.     Appearance: Normal appearance. He is normal weight. He is not ill-appearing, toxic-appearing or diaphoretic.   HENT:      Head: Normocephalic and atraumatic.      Right Ear: External ear normal.      Left Ear: External ear normal.      Nose: Nose normal. No congestion or rhinorrhea.      Mouth/Throat:      Mouth: Mucous membranes are moist.     Eyes:      General: No scleral icterus.        Right eye: No discharge.         Left eye: No discharge.      Extraocular Movements: Extraocular movements intact.      Conjunctiva/sclera: Conjunctivae normal.       Cardiovascular:      Rate and Rhythm: Normal rate and regular rhythm.      Pulses: Normal pulses.      Heart sounds: Normal heart sounds. No murmur heard.     No friction rub. No gallop.   Pulmonary:      Effort: Pulmonary effort is normal. No respiratory distress.      Breath sounds: Normal breath sounds. No wheezing, rhonchi or rales.   Abdominal:      General: Abdomen is flat. There is no distension.      Tenderness: There is no abdominal tenderness. There is no guarding or rebound.     Musculoskeletal:         General: No signs of injury. Normal range of motion.      Cervical back: Normal range of motion and neck supple. No rigidity.     Skin:     General: Skin is warm.      Capillary Refill: Capillary refill takes less than 2 seconds.      Coloration: Skin is not pale.      Findings: No erythema.     Neurological:      General: No focal deficit present.      Mental Status: He is alert and oriented to person, place, and time. Mental status is at baseline.      GCS: GCS eye subscore is 4. GCS verbal subscore is  5. GCS motor subscore is 6.      Cranial Nerves: Cranial nerves 2-12 are intact. No cranial nerve deficit or facial asymmetry.      Sensory: Sensation is intact. No sensory deficit.      Motor: No weakness, tremor, atrophy, abnormal muscle tone, seizure activity or pronator drift.      Coordination: Coordination is intact. Coordination normal.      Gait: Gait is intact. Gait normal.     Psychiatric:         Mood and Affect: Mood normal.         Behavior: Behavior normal.         Results Reviewed       Procedure Component Value Units Date/Time    HS Troponin 0hr (reflex protocol) [[id number]]  (Normal) Collected: 05/12/25 2158    Lab Status: Final result Specimen: Blood from Arm, Left Updated: 05/12/25 2253     hs TnI 0hr 3 ng/L     Comprehensive metabolic panel [[id number]] Collected: 05/12/25 2158    Lab Status: Final result Specimen: Blood from Arm, Left Updated: 05/12/25 2246     Sodium 140 mmol/L      Potassium 3.5 mmol/L      Chloride 106 mmol/L      CO2 28 mmol/L      ANION GAP 6 mmol/L      BUN 13 mg/dL      Creatinine 1.02 mg/dL      Glucose 123 mg/dL      Calcium 9.3 mg/dL      AST 14 U/L      ALT 12 U/L      Alkaline Phosphatase 67 U/L      Total Protein 6.5 g/dL      Albumin 4.4 g/dL      Total Bilirubin 0.60 mg/dL      eGFR 92 ml/min/1.73sq m     Narrative:      [employer] guidelines for Chronic Kidney Disease (CKD):     Stage 1 with normal or high GFR (GFR > 90 mL/min/1.73 square meters)    Stage 2 Mild CKD (GFR = 60-89 mL/min/1.73 square meters)    Stage 3A Moderate CKD (GFR = 45-59 mL/min/1.73 square meters)    Stage 3B Moderate CKD (GFR = 30-44 mL/min/1.73 square meters)    Stage 4 Severe CKD (GFR = 15-29 mL/min/1.73 square meters)    Stage 5 End Stage CKD (GFR <15 mL/min/1.73 square meters)  Note: GFR calculation is accurate only with a steady state creatinine    CBC and differential [[id number]]  (Abnormal) Collected: 05/12/25 2158    Lab Status: Final result Specimen: Blood  from Arm, Left Updated: 05/12/25 2229     WBC 7.28 Thousand/uL      RBC 4.34 Million/uL      Hemoglobin 13.7 g/dL      Hematocrit 39.1 %      MCV 90 fL      MCH 31.6 pg      MCHC 35.0 g/dL      RDW 11.9 %      MPV 9.4 fL      Platelets 239 Thousands/uL      nRBC 0 /100 WBCs      Segmented % 42 %      Immature Grans % 0 %      Lymphocytes % 48 %      Monocytes % 6 %      Eosinophils Relative 3 %      Basophils Relative 1 %      Absolute Neutrophils 3.03 Thousands/µL      Absolute Immature Grans 0.02 Thousand/uL      Absolute Lymphocytes 3.52 Thousands/µL      Absolute Monocytes 0.44 Thousand/µL      Eosinophils Absolute 0.22 Thousand/µL      Basophils Absolute 0.05 Thousands/µL             No orders to display       ECG 12 Lead Documentation Only    Date/Time: 5/12/2025 10:01 PM    Performed by: Sheridan Borjas PA-C  Authorized by: Sheridan Borjas PA-C    Indications / Diagnosis:  Dizziness  ECG reviewed by me, the ED Provider: yes    Patient location:  ED  Previous ECG:     Previous ECG:  Unavailable    Comparison to cardiac monitor: Yes    Interpretation:     Interpretation: normal    Rate:     ECG rate:  67  Rhythm:     Rhythm: sinus rhythm    Ectopy:     Ectopy: none    QRS:     QRS axis:  Normal    QRS intervals:  Normal  Conduction:     Conduction: normal    ST segments:     ST segments:  Normal  T waves:     T waves: non-specific        ED Medication and Procedure Management   Prior to Admission Medications   Prescriptions Last Dose Informant Patient Reported? Taking?   amLODIPine (NORVASC) 5 mg tablet   No No   Sig: TAKE 1 TABLET (5 MG TOTAL) BY MOUTH DAILY.   escitalopram (Lexapro) 10 mg tablet  Self No No   Sig: Take 1 tablet (10 mg total) by mouth daily   Patient taking differently: Take 10 mg by mouth daily 1 tablet daily   lamoTRIgine (LaMICtal) 25 mg tablet   Yes No   Sig: Take 25 mg by mouth daily 2 tablets daily   meloxicam (MOBIC) 7.5 mg tablet   Yes No   Sig: Take 7.5 mg by mouth 2 (two) times a  day with meals   Patient not taking: Reported on 2024   oxyCODONE (ROXICODONE) 15 mg immediate release tablet   Yes No   Sig: Take 15 mg by mouth every 8 (eight) hours as needed   terbinafine (LamISIL) 1 % cream   No No   Sig: Apply topically 2 (two) times a day   Patient not taking: Reported on 2024   tiZANidine (ZANAFLEX) 4 mg tablet   Yes No   Si mg 2 (two) times a day      Facility-Administered Medications: None     Discharge Medication List as of 2025 11:27 PM        CONTINUE these medications which have NOT CHANGED    Details   amLODIPine (NORVASC) 5 mg tablet TAKE 1 TABLET (5 MG TOTAL) BY MOUTH DAILY., Starting Mon 3/24/2025, Normal      escitalopram (Lexapro) 10 mg tablet Take 1 tablet (10 mg total) by mouth daily, Starting Tue 2024, Until Sat 10/26/2024, Normal      lamoTRIgine (LaMICtal) 25 mg tablet Take 25 mg by mouth daily 2 tablets daily, Historical Med      meloxicam (MOBIC) 7.5 mg tablet Take 7.5 mg by mouth 2 (two) times a day with meals, Starting Thu 2022, Historical Med      oxyCODONE (ROXICODONE) 15 mg immediate release tablet Take 15 mg by mouth every 8 (eight) hours as needed, Starting Thu 2022, Historical Med      terbinafine (LamISIL) 1 % cream Apply topically 2 (two) times a day, Starting Mon 2024, Normal      tiZANidine (ZANAFLEX) 4 mg tablet 4 mg 2 (two) times a day, Starting Thu 2022, Historical Med           No discharge procedures on file.  ED SEPSIS DOCUMENTATION   Time reflects when diagnosis was documented in both MDM as applicable and the Disposition within this note       Time User Action Codes Description Comment    2025 11:21 PM Sheridan Borjas [R42] Dizziness     2025 11:21 PM Sheridan Borjas [F41.9] Anxiety                  Sheridan Borjas PA-C  [id number]

## 2025-05-14 LAB
ATRIAL RATE: 67 BPM
P AXIS: 76 DEGREES
PR INTERVAL: 166 MS
QRS AXIS: 76 DEGREES
QRSD INTERVAL: 82 MS
QT INTERVAL: 406 MS
QTC INTERVAL: 429 MS
T WAVE AXIS: 31 DEGREES
VENTRICULAR RATE: 67 BPM

## 2025-05-14 PROCEDURE — 93010 ELECTROCARDIOGRAM REPORT: CPT | Performed by: INTERNAL MEDICINE

## 2025-06-19 DIAGNOSIS — I10 HYPERTENSION, UNSPECIFIED TYPE: ICD-10-CM

## 2025-06-19 RX ORDER — AMLODIPINE BESYLATE 5 MG/1
5 TABLET ORAL DAILY
Qty: 30 TABLET | Refills: 2 | Status: SHIPPED | OUTPATIENT
Start: 2025-06-19

## (undated) DEVICE — INTENDED FOR TISSUE SEPARATION, AND OTHER PROCEDURES THAT REQUIRE A SHARP SURGICAL BLADE TO PUNCTURE OR CUT.: Brand: BARD-PARKER SAFETY BLADES SIZE 15, STERILE

## (undated) DEVICE — SUT MONOCRYL 4-0 PC-5 18 IN Y823G

## (undated) DEVICE — DRAPE UTILITY

## (undated) DEVICE — SUT PROLENE 2-0 RB-1/RB-1 36 IN 8559H

## (undated) DEVICE — BASIC DOUBLE BASIN 2-LF: Brand: MEDLINE INDUSTRIES, INC.

## (undated) DEVICE — CHLORAPREP HI-LITE 26ML ORANGE

## (undated) DEVICE — PACK GENERAL LF

## (undated) DEVICE — SUT VICRYL 3-0 SH 27 IN J416H

## (undated) DEVICE — SUT VICRYL 3-0 18 IN J904T

## (undated) DEVICE — GLOVE SRG BIOGEL 8

## (undated) DEVICE — TIBURON LAPAROTOMY DRAPE: Brand: CONVERTORS

## (undated) DEVICE — GLOVE INDICATOR PI UNDERGLOVE SZ 7.5 BLUE

## (undated) DEVICE — ADHESIVE SKIN HIGH VISCOSITY EXOFIN 1ML

## (undated) DEVICE — PLUMEPEN PRO 10FT

## (undated) DEVICE — SUT SILK 2-0 SH 30 IN K833H

## (undated) DEVICE — STRL PENROSE DRAIN 18" X 1/4": Brand: CARDINAL HEALTH

## (undated) DEVICE — ASTOUND STANDARD SURGICAL GOWN, XL: Brand: CONVERTORS

## (undated) DEVICE — SUT VICRYL 2-0 SH 27 IN UNDYED J417H